# Patient Record
Sex: FEMALE | Race: ASIAN | Employment: UNEMPLOYED | URBAN - METROPOLITAN AREA
[De-identification: names, ages, dates, MRNs, and addresses within clinical notes are randomized per-mention and may not be internally consistent; named-entity substitution may affect disease eponyms.]

---

## 2023-06-19 LAB
ANTIBODY SCREEN OB: NEGATIVE
HEPATITIS B SURFACE ANTIGEN OB: NEGATIVE
HIV ANTIGEN-ANTIBODY QUAL: NONREACTIVE
RH FACTOR OB: POSITIVE
SYPHILIS-TOTAL QUAL: NONREACTIVE

## 2023-10-17 LAB — HIV ANTIGEN-ANTIBODY QUAL: NONREACTIVE

## 2023-12-12 LAB — STREPTOCOCCUS GROUP B PCR: NEGATIVE

## 2023-12-20 ENCOUNTER — TELEPHONE (OUTPATIENT)
Dept: OBGYN UNIT | Facility: HOSPITAL | Age: 38
End: 2023-12-20

## 2023-12-20 RX ORDER — CHOLECALCIFEROL (VITAMIN D3) 25 MCG
1 TABLET,CHEWABLE ORAL DAILY
COMMUNITY

## 2023-12-20 RX ORDER — MELATONIN
325
COMMUNITY

## 2023-12-21 ENCOUNTER — HOSPITAL ENCOUNTER (OUTPATIENT)
Facility: HOSPITAL | Age: 38
Discharge: HOME | End: 2023-12-21
Attending: STUDENT IN AN ORGANIZED HEALTH CARE EDUCATION/TRAINING PROGRAM | Admitting: STUDENT IN AN ORGANIZED HEALTH CARE EDUCATION/TRAINING PROGRAM
Payer: COMMERCIAL

## 2023-12-21 ENCOUNTER — APPOINTMENT (OUTPATIENT)
Dept: OBGYN CLINIC | Facility: HOSPITAL | Age: 38
End: 2023-12-21
Payer: COMMERCIAL

## 2023-12-21 VITALS
DIASTOLIC BLOOD PRESSURE: 91 MMHG | WEIGHT: 164.81 LBS | HEART RATE: 127 BPM | BODY MASS INDEX: 30.33 KG/M2 | HEIGHT: 62 IN | TEMPERATURE: 98 F | SYSTOLIC BLOOD PRESSURE: 139 MMHG

## 2023-12-21 LAB
ANTIBODY SCREEN: NEGATIVE
BASOPHILS # BLD AUTO: 0.03 X10(3) UL (ref 0–0.2)
BASOPHILS NFR BLD AUTO: 0.3 %
EOSINOPHIL # BLD AUTO: 0.1 X10(3) UL (ref 0–0.7)
EOSINOPHIL NFR BLD AUTO: 1 %
ERYTHROCYTE [DISTWIDTH] IN BLOOD BY AUTOMATED COUNT: 13.5 %
HCT VFR BLD AUTO: 36.3 %
HGB BLD-MCNC: 12.6 G/DL
IMM GRANULOCYTES # BLD AUTO: 0.08 X10(3) UL (ref 0–1)
IMM GRANULOCYTES NFR BLD: 0.8 %
LYMPHOCYTES # BLD AUTO: 2.28 X10(3) UL (ref 1–4)
LYMPHOCYTES NFR BLD AUTO: 22.6 %
MCH RBC QN AUTO: 28.7 PG (ref 26–34)
MCHC RBC AUTO-ENTMCNC: 34.7 G/DL (ref 31–37)
MCV RBC AUTO: 82.7 FL
MONOCYTES # BLD AUTO: 0.72 X10(3) UL (ref 0.1–1)
MONOCYTES NFR BLD AUTO: 7.1 %
NEUTROPHILS # BLD AUTO: 6.86 X10 (3) UL (ref 1.5–7.7)
NEUTROPHILS # BLD AUTO: 6.86 X10(3) UL (ref 1.5–7.7)
NEUTROPHILS NFR BLD AUTO: 68.2 %
PLATELET # BLD AUTO: 217 10(3)UL (ref 150–450)
RBC # BLD AUTO: 4.39 X10(6)UL
RH BLOOD TYPE: POSITIVE
WBC # BLD AUTO: 10.1 X10(3) UL (ref 4–11)

## 2023-12-21 PROCEDURE — 86901 BLOOD TYPING SEROLOGIC RH(D): CPT | Performed by: STUDENT IN AN ORGANIZED HEALTH CARE EDUCATION/TRAINING PROGRAM

## 2023-12-21 PROCEDURE — 59025 FETAL NON-STRESS TEST: CPT

## 2023-12-21 PROCEDURE — 86900 BLOOD TYPING SEROLOGIC ABO: CPT | Performed by: STUDENT IN AN ORGANIZED HEALTH CARE EDUCATION/TRAINING PROGRAM

## 2023-12-21 PROCEDURE — 86850 RBC ANTIBODY SCREEN: CPT | Performed by: STUDENT IN AN ORGANIZED HEALTH CARE EDUCATION/TRAINING PROGRAM

## 2023-12-21 PROCEDURE — 85025 COMPLETE CBC W/AUTO DIFF WBC: CPT | Performed by: STUDENT IN AN ORGANIZED HEALTH CARE EDUCATION/TRAINING PROGRAM

## 2023-12-21 PROCEDURE — 59412 ANTEPARTUM MANIPULATION: CPT

## 2023-12-21 PROCEDURE — 36415 COLL VENOUS BLD VENIPUNCTURE: CPT

## 2023-12-21 PROCEDURE — 96372 THER/PROPH/DIAG INJ SC/IM: CPT

## 2023-12-21 RX ORDER — TERBUTALINE SULFATE 1 MG/ML
0.25 INJECTION, SOLUTION SUBCUTANEOUS ONCE
Status: COMPLETED | OUTPATIENT
Start: 2023-12-21 | End: 2023-12-21

## 2023-12-21 RX ORDER — SODIUM CHLORIDE, SODIUM LACTATE, POTASSIUM CHLORIDE, CALCIUM CHLORIDE 600; 310; 30; 20 MG/100ML; MG/100ML; MG/100ML; MG/100ML
INJECTION, SOLUTION INTRAVENOUS CONTINUOUS
Status: DISCONTINUED | OUTPATIENT
Start: 2023-12-21 | End: 2023-12-21

## 2023-12-21 NOTE — PROGRESS NOTES
Patient discharged home with her  Stevie Jacob. Reviewed all paperwork. Patient verbalized she understood. Avsd of follow up appointment. Patent left in stable condition.

## 2023-12-21 NOTE — PROCEDURES
Date 2023  Procedure: External cephalic version  Diagnosis: complete breech presentation    Procedure:  A nonstress test was performed and found to be reactive. An ultrasound revealed that the baby was asher breech with the head in the maternal right upper quadrant. Informed consent was obtained. 0.25mg terbutaline was given subcutaneously. Gel was applied to her abdomen, and gentle pressure was applied to lift the breech out of the pelvis. Gentle pressure was used to attempt forward roll. Fetal heart tones were intermittently obtained and were in the 150s. Head moved to maternal left upper quadrant and no futher advancement made. Attempt was made in reverse direction and head moved to right upper quadrant and then no further movement made. She tolerated the procedure well. A nonstress test was again performed and was found to be reactive and reassuring for two hour. There were no complications. Will schedule patient for primary  section at 39wga.     Machelle Marino MD

## 2024-01-01 ENCOUNTER — ANESTHESIA EVENT (OUTPATIENT)
Dept: OBGYN UNIT | Facility: HOSPITAL | Age: 39
End: 2024-01-01
Payer: COMMERCIAL

## 2024-01-02 ENCOUNTER — HOSPITAL ENCOUNTER (INPATIENT)
Facility: HOSPITAL | Age: 39
LOS: 3 days | Discharge: HOME | End: 2024-01-05
Attending: OBSTETRICS & GYNECOLOGY | Admitting: OBSTETRICS & GYNECOLOGY
Payer: COMMERCIAL

## 2024-01-02 ENCOUNTER — ANESTHESIA (OUTPATIENT)
Dept: OBGYN UNIT | Facility: HOSPITAL | Age: 39
End: 2024-01-02
Payer: COMMERCIAL

## 2024-01-02 DIAGNOSIS — Z98.891 H/O CESAREAN SECTION: Primary | ICD-10-CM

## 2024-01-02 PROBLEM — Z34.90 PREGNANCY (HCC): Status: ACTIVE | Noted: 2024-01-02

## 2024-01-02 PROBLEM — Z34.90 PREGNANCY: Status: ACTIVE | Noted: 2024-01-02

## 2024-01-02 PROBLEM — I48.91 ATRIAL FIBRILLATION (HCC): Status: ACTIVE | Noted: 2024-01-02

## 2024-01-02 LAB
ANION GAP SERPL CALC-SCNC: 7 MMOL/L (ref 0–18)
ANTIBODY SCREEN: NEGATIVE
BASOPHILS # BLD AUTO: 0.02 X10(3) UL (ref 0–0.2)
BASOPHILS # BLD AUTO: 0.02 X10(3) UL (ref 0–0.2)
BASOPHILS NFR BLD AUTO: 0.1 %
BASOPHILS NFR BLD AUTO: 0.2 %
BUN BLD-MCNC: 8 MG/DL (ref 9–23)
CALCIUM BLD-MCNC: 8.2 MG/DL (ref 8.5–10.1)
CHLORIDE SERPL-SCNC: 110 MMOL/L (ref 98–112)
CO2 SERPL-SCNC: 21 MMOL/L (ref 21–32)
CREAT BLD-MCNC: 0.35 MG/DL
EGFRCR SERPLBLD CKD-EPI 2021: 136 ML/MIN/1.73M2 (ref 60–?)
EOSINOPHIL # BLD AUTO: 0.03 X10(3) UL (ref 0–0.7)
EOSINOPHIL # BLD AUTO: 0.09 X10(3) UL (ref 0–0.7)
EOSINOPHIL NFR BLD AUTO: 0.2 %
EOSINOPHIL NFR BLD AUTO: 0.9 %
ERYTHROCYTE [DISTWIDTH] IN BLOOD BY AUTOMATED COUNT: 13.6 %
ERYTHROCYTE [DISTWIDTH] IN BLOOD BY AUTOMATED COUNT: 13.6 %
GLUCOSE BLD-MCNC: 67 MG/DL (ref 70–99)
HCT VFR BLD AUTO: 34.1 %
HCT VFR BLD AUTO: 38 %
HGB BLD-MCNC: 11.5 G/DL
HGB BLD-MCNC: 12.9 G/DL
IMM GRANULOCYTES # BLD AUTO: 0.07 X10(3) UL (ref 0–1)
IMM GRANULOCYTES # BLD AUTO: 0.1 X10(3) UL (ref 0–1)
IMM GRANULOCYTES NFR BLD: 0.5 %
IMM GRANULOCYTES NFR BLD: 1 %
LYMPHOCYTES # BLD AUTO: 1.85 X10(3) UL (ref 1–4)
LYMPHOCYTES # BLD AUTO: 2.01 X10(3) UL (ref 1–4)
LYMPHOCYTES NFR BLD AUTO: 11.9 %
LYMPHOCYTES NFR BLD AUTO: 20.7 %
MAGNESIUM SERPL-MCNC: 1.6 MG/DL (ref 1.6–2.6)
MCH RBC QN AUTO: 28.5 PG (ref 26–34)
MCH RBC QN AUTO: 29 PG (ref 26–34)
MCHC RBC AUTO-ENTMCNC: 33.7 G/DL (ref 31–37)
MCHC RBC AUTO-ENTMCNC: 33.9 G/DL (ref 31–37)
MCV RBC AUTO: 84.1 FL
MCV RBC AUTO: 85.9 FL
MONOCYTES # BLD AUTO: 0.73 X10(3) UL (ref 0.1–1)
MONOCYTES # BLD AUTO: 1.14 X10(3) UL (ref 0.1–1)
MONOCYTES NFR BLD AUTO: 7.4 %
MONOCYTES NFR BLD AUTO: 7.5 %
NEUTROPHILS # BLD AUTO: 12.38 X10 (3) UL (ref 1.5–7.7)
NEUTROPHILS # BLD AUTO: 12.38 X10(3) UL (ref 1.5–7.7)
NEUTROPHILS # BLD AUTO: 6.76 X10 (3) UL (ref 1.5–7.7)
NEUTROPHILS # BLD AUTO: 6.76 X10(3) UL (ref 1.5–7.7)
NEUTROPHILS NFR BLD AUTO: 69.7 %
NEUTROPHILS NFR BLD AUTO: 79.9 %
OSMOLALITY SERPL CALC.SUM OF ELEC: 283 MOSM/KG (ref 275–295)
PLATELET # BLD AUTO: 199 10(3)UL (ref 150–450)
PLATELET # BLD AUTO: 235 10(3)UL (ref 150–450)
POTASSIUM SERPL-SCNC: 3.8 MMOL/L (ref 3.5–5.1)
POTASSIUM SERPL-SCNC: 3.8 MMOL/L (ref 3.5–5.1)
RBC # BLD AUTO: 3.97 X10(6)UL
RBC # BLD AUTO: 4.52 X10(6)UL
RH BLOOD TYPE: POSITIVE
SODIUM SERPL-SCNC: 138 MMOL/L (ref 136–145)
TSI SER-ACNC: 2.54 MIU/ML (ref 0.36–3.74)
WBC # BLD AUTO: 15.5 X10(3) UL (ref 4–11)
WBC # BLD AUTO: 9.7 X10(3) UL (ref 4–11)

## 2024-01-02 PROCEDURE — 99254 IP/OBS CNSLTJ NEW/EST MOD 60: CPT | Performed by: HOSPITALIST

## 2024-01-02 RX ORDER — BUPIVACAINE HYDROCHLORIDE 7.5 MG/ML
INJECTION, SOLUTION INTRASPINAL AS NEEDED
Status: DISCONTINUED | OUTPATIENT
Start: 2024-01-02 | End: 2024-01-02 | Stop reason: SURG

## 2024-01-02 RX ORDER — ONDANSETRON 2 MG/ML
4 INJECTION INTRAMUSCULAR; INTRAVENOUS EVERY 6 HOURS PRN
Status: DISCONTINUED | OUTPATIENT
Start: 2024-01-02 | End: 2024-01-05

## 2024-01-02 RX ORDER — SIMETHICONE 80 MG
80 TABLET,CHEWABLE ORAL 3 TIMES DAILY PRN
Status: DISCONTINUED | OUTPATIENT
Start: 2024-01-02 | End: 2024-01-05

## 2024-01-02 RX ORDER — POTASSIUM CHLORIDE 20 MEQ/1
40 TABLET, EXTENDED RELEASE ORAL ONCE
Status: COMPLETED | OUTPATIENT
Start: 2024-01-02 | End: 2024-01-02

## 2024-01-02 RX ORDER — SODIUM CHLORIDE, SODIUM LACTATE, POTASSIUM CHLORIDE, CALCIUM CHLORIDE 600; 310; 30; 20 MG/100ML; MG/100ML; MG/100ML; MG/100ML
125 INJECTION, SOLUTION INTRAVENOUS CONTINUOUS
Status: DISCONTINUED | OUTPATIENT
Start: 2024-01-02 | End: 2024-01-02

## 2024-01-02 RX ORDER — ONDANSETRON 2 MG/ML
4 INJECTION INTRAMUSCULAR; INTRAVENOUS ONCE AS NEEDED
Status: DISCONTINUED | OUTPATIENT
Start: 2024-01-02 | End: 2024-01-02 | Stop reason: HOSPADM

## 2024-01-02 RX ORDER — CEFAZOLIN SODIUM/WATER 2 G/20 ML
2 SYRINGE (ML) INTRAVENOUS ONCE
Status: COMPLETED | OUTPATIENT
Start: 2024-01-02 | End: 2024-01-02

## 2024-01-02 RX ORDER — ACETAMINOPHEN 500 MG
1000 TABLET ORAL EVERY 6 HOURS
Status: DISCONTINUED | OUTPATIENT
Start: 2024-01-02 | End: 2024-01-05

## 2024-01-02 RX ORDER — KETOROLAC TROMETHAMINE 30 MG/ML
30 INJECTION, SOLUTION INTRAMUSCULAR; INTRAVENOUS EVERY 6 HOURS
Status: DISPENSED | OUTPATIENT
Start: 2024-01-02 | End: 2024-01-03

## 2024-01-02 RX ORDER — KETOROLAC TROMETHAMINE 30 MG/ML
30 INJECTION, SOLUTION INTRAMUSCULAR; INTRAVENOUS ONCE
Status: COMPLETED | OUTPATIENT
Start: 2024-01-02 | End: 2024-01-02

## 2024-01-02 RX ORDER — PHENYLEPHRINE HCL 10 MG/ML
VIAL (ML) INJECTION AS NEEDED
Status: DISCONTINUED | OUTPATIENT
Start: 2024-01-02 | End: 2024-01-02 | Stop reason: SURG

## 2024-01-02 RX ORDER — NALBUPHINE HYDROCHLORIDE 10 MG/ML
2.5 INJECTION, SOLUTION INTRAMUSCULAR; INTRAVENOUS; SUBCUTANEOUS
Status: DISCONTINUED | OUTPATIENT
Start: 2024-01-02 | End: 2024-01-02 | Stop reason: HOSPADM

## 2024-01-02 RX ORDER — DIPHENHYDRAMINE HCL 25 MG
25 CAPSULE ORAL EVERY 4 HOURS PRN
Status: DISCONTINUED | OUTPATIENT
Start: 2024-01-02 | End: 2024-01-05

## 2024-01-02 RX ORDER — HYDROCODONE BITARTRATE AND ACETAMINOPHEN 5; 325 MG/1; MG/1
2 TABLET ORAL EVERY 6 HOURS PRN
Status: DISCONTINUED | OUTPATIENT
Start: 2024-01-02 | End: 2024-01-05

## 2024-01-02 RX ORDER — HYDROMORPHONE HYDROCHLORIDE 1 MG/ML
0.2 INJECTION, SOLUTION INTRAMUSCULAR; INTRAVENOUS; SUBCUTANEOUS EVERY 5 MIN PRN
Status: DISCONTINUED | OUTPATIENT
Start: 2024-01-02 | End: 2024-01-02 | Stop reason: HOSPADM

## 2024-01-02 RX ORDER — ACETAMINOPHEN 500 MG
500 TABLET ORAL EVERY 4 HOURS PRN
Status: DISCONTINUED | OUTPATIENT
Start: 2024-01-02 | End: 2024-01-05

## 2024-01-02 RX ORDER — HYDROCODONE BITARTRATE AND ACETAMINOPHEN 5; 325 MG/1; MG/1
1 TABLET ORAL EVERY 6 HOURS PRN
Status: DISCONTINUED | OUTPATIENT
Start: 2024-01-02 | End: 2024-01-05

## 2024-01-02 RX ORDER — ONDANSETRON 2 MG/ML
4 INJECTION INTRAMUSCULAR; INTRAVENOUS EVERY 6 HOURS PRN
Status: DISCONTINUED | OUTPATIENT
Start: 2024-01-02 | End: 2024-01-02

## 2024-01-02 RX ORDER — NALOXONE HYDROCHLORIDE 0.4 MG/ML
0.08 INJECTION, SOLUTION INTRAMUSCULAR; INTRAVENOUS; SUBCUTANEOUS
Status: ACTIVE | OUTPATIENT
Start: 2024-01-02 | End: 2024-01-03

## 2024-01-02 RX ORDER — DOCUSATE SODIUM 100 MG/1
100 CAPSULE, LIQUID FILLED ORAL
Status: DISCONTINUED | OUTPATIENT
Start: 2024-01-02 | End: 2024-01-05

## 2024-01-02 RX ORDER — SODIUM CHLORIDE, SODIUM LACTATE, POTASSIUM CHLORIDE, CALCIUM CHLORIDE 600; 310; 30; 20 MG/100ML; MG/100ML; MG/100ML; MG/100ML
INJECTION, SOLUTION INTRAVENOUS CONTINUOUS
Status: DISCONTINUED | OUTPATIENT
Start: 2024-01-02 | End: 2024-01-05

## 2024-01-02 RX ORDER — METOPROLOL TARTRATE 1 MG/ML
5 INJECTION, SOLUTION INTRAVENOUS EVERY 6 HOURS PRN
Status: DISCONTINUED | OUTPATIENT
Start: 2024-01-02 | End: 2024-01-05

## 2024-01-02 RX ORDER — DEXTROSE, SODIUM CHLORIDE, SODIUM LACTATE, POTASSIUM CHLORIDE, AND CALCIUM CHLORIDE 5; .6; .31; .03; .02 G/100ML; G/100ML; G/100ML; G/100ML; G/100ML
INJECTION, SOLUTION INTRAVENOUS CONTINUOUS PRN
Status: DISCONTINUED | OUTPATIENT
Start: 2024-01-02 | End: 2024-01-05

## 2024-01-02 RX ORDER — MAGNESIUM OXIDE 400 MG/1
400 TABLET ORAL ONCE
Status: COMPLETED | OUTPATIENT
Start: 2024-01-02 | End: 2024-01-02

## 2024-01-02 RX ORDER — ONDANSETRON 2 MG/ML
INJECTION INTRAMUSCULAR; INTRAVENOUS
Status: COMPLETED
Start: 2024-01-02 | End: 2024-01-02

## 2024-01-02 RX ORDER — SODIUM CHLORIDE 9 MG/ML
INJECTION, SOLUTION INTRAVENOUS CONTINUOUS
Status: DISCONTINUED | OUTPATIENT
Start: 2024-01-02 | End: 2024-01-05

## 2024-01-02 RX ORDER — ACETAMINOPHEN 500 MG
1000 TABLET ORAL ONCE
Status: COMPLETED | OUTPATIENT
Start: 2024-01-02 | End: 2024-01-02

## 2024-01-02 RX ORDER — MORPHINE SULFATE 2 MG/ML
INJECTION, SOLUTION INTRAMUSCULAR; INTRAVENOUS AS NEEDED
Status: DISCONTINUED | OUTPATIENT
Start: 2024-01-02 | End: 2024-01-02 | Stop reason: SURG

## 2024-01-02 RX ORDER — CITRIC ACID/SODIUM CITRATE 334-500MG
30 SOLUTION, ORAL ORAL ONCE
Status: DISCONTINUED | OUTPATIENT
Start: 2024-01-02 | End: 2024-01-02

## 2024-01-02 RX ORDER — METOCLOPRAMIDE HYDROCHLORIDE 5 MG/ML
10 INJECTION INTRAMUSCULAR; INTRAVENOUS EVERY 6 HOURS PRN
Status: DISCONTINUED | OUTPATIENT
Start: 2024-01-02 | End: 2024-01-05

## 2024-01-02 RX ORDER — EPHEDRINE SULFATE 50 MG/ML
INJECTION INTRAVENOUS AS NEEDED
Status: DISCONTINUED | OUTPATIENT
Start: 2024-01-02 | End: 2024-01-02 | Stop reason: SURG

## 2024-01-02 RX ORDER — NALBUPHINE HYDROCHLORIDE 10 MG/ML
2.5 INJECTION, SOLUTION INTRAMUSCULAR; INTRAVENOUS; SUBCUTANEOUS EVERY 4 HOURS PRN
Status: DISCONTINUED | OUTPATIENT
Start: 2024-01-02 | End: 2024-01-05

## 2024-01-02 RX ORDER — KETOROLAC TROMETHAMINE 30 MG/ML
INJECTION, SOLUTION INTRAMUSCULAR; INTRAVENOUS
Status: DISPENSED
Start: 2024-01-02 | End: 2024-01-03

## 2024-01-02 RX ORDER — DIPHENHYDRAMINE HYDROCHLORIDE 50 MG/ML
12.5 INJECTION INTRAMUSCULAR; INTRAVENOUS EVERY 4 HOURS PRN
Status: DISCONTINUED | OUTPATIENT
Start: 2024-01-02 | End: 2024-01-05

## 2024-01-02 RX ORDER — BISACODYL 10 MG
10 SUPPOSITORY, RECTAL RECTAL ONCE AS NEEDED
Status: DISCONTINUED | OUTPATIENT
Start: 2024-01-02 | End: 2024-01-05

## 2024-01-02 RX ORDER — DILTIAZEM HYDROCHLORIDE 5 MG/ML
10 INJECTION INTRAVENOUS ONCE
Status: COMPLETED | OUTPATIENT
Start: 2024-01-02 | End: 2024-01-02

## 2024-01-02 RX ADMIN — PHENYLEPHRINE HCL 100 MCG: 10 MG/ML VIAL (ML) INJECTION at 13:57:00

## 2024-01-02 RX ADMIN — SODIUM CHLORIDE, SODIUM LACTATE, POTASSIUM CHLORIDE, CALCIUM CHLORIDE: 600; 310; 30; 20 INJECTION, SOLUTION INTRAVENOUS at 13:28:00

## 2024-01-02 RX ADMIN — CEFAZOLIN SODIUM/WATER 2 G: 2 G/20 ML SYRINGE (ML) INTRAVENOUS at 13:28:00

## 2024-01-02 RX ADMIN — BUPIVACAINE HYDROCHLORIDE 1.5 ML: 7.5 INJECTION, SOLUTION INTRASPINAL at 13:33:00

## 2024-01-02 RX ADMIN — SODIUM CHLORIDE, SODIUM LACTATE, POTASSIUM CHLORIDE, CALCIUM CHLORIDE: 600; 310; 30; 20 INJECTION, SOLUTION INTRAVENOUS at 13:57:00

## 2024-01-02 RX ADMIN — PHENYLEPHRINE HCL 100 MCG: 10 MG/ML VIAL (ML) INJECTION at 13:39:00

## 2024-01-02 RX ADMIN — SODIUM CHLORIDE, SODIUM LACTATE, POTASSIUM CHLORIDE, CALCIUM CHLORIDE: 600; 310; 30; 20 INJECTION, SOLUTION INTRAVENOUS at 14:19:00

## 2024-01-02 RX ADMIN — PHENYLEPHRINE HCL 100 MCG: 10 MG/ML VIAL (ML) INJECTION at 14:03:00

## 2024-01-02 RX ADMIN — MORPHINE SULFATE 0.2 MG: 2 INJECTION, SOLUTION INTRAMUSCULAR; INTRAVENOUS at 13:33:00

## 2024-01-02 RX ADMIN — EPHEDRINE SULFATE 10 MG: 50 INJECTION INTRAVENOUS at 13:36:00

## 2024-01-02 NOTE — PROGRESS NOTES
Admitted to mother/baby per cart.  Oriented to room.  Safety precautions initiated.  Bed in low position.  Call light within reach.  IV infusing on pump, Smith to gravity, SCD's being applied.

## 2024-01-02 NOTE — PROGRESS NOTES
Pt is a 38 year old female admitted to TRG4/TRG4-A.     Chief Complaint   Patient presents with    Scheduled      Breech      Pt is  39w0d intra-uterine pregnancy.  History obtained, consents signed. Oriented to room, staff, and plan of care.     EFM tested and applied. Abdomen soft and non-tender. Active fetal movement per patient report.

## 2024-01-02 NOTE — H&P
Pt is 37 y/o G1 at 39w here for primary CS for known Breech - US confirms breech this AM- Leopold's by me confirms Breech as well. Hd attempt at ECV - unsuccessful   PMH AMA - nl level 2 and nl at 32 weeks - EFW 48% - pt's mother with achondroplasia            GBS neg            Low risk NIPT            Nl 1 hour     Current Outpatient Medications  Medication Sig Dispense Refill  Docosahexaenoic Acid (PRENATAL DHA) 200 MG Oral Cap Take by mouth.    History reviewed. No pertinent past medical history.  History reviewed. No pertinent surgical history.  Family History  Problem Relation Age of Onset  Other (Achondroplasia) Mother    Social History:  Social History  Tobacco Use  Smoking status: Never  Smokeless tobacco: Never  Vaping Use  Vaping Use: Never used  Alcohol use: Never  Drug use: Never  ./52   Pulse 105   Temp 98.7 °F (37.1 °C) (Oral)   Resp 16   Wt 165 lb 12.8 oz (75.2 kg)   LMP 04/04/2023   BMI 30.33 kg/m²   Lungs - clear  FHT's - cat 1 - + accels  Ctx's - irregular - does not feel them  A/P Term IUP - Breech presentation - for primary CS - D/W pt and her  risk of bleeding/infection/anesthesia complication - also risk of adjacent organs such as bowel, bladder, ureters.  They understand and accept these risks and desire to proceeed

## 2024-01-02 NOTE — PROGRESS NOTES
Pt transferred to [facility] in room 2206. Vital signs stable on transfer. All belongings sent with patient. Baby ID bands matched and verified with parents. Report given to Marija Lawrence.

## 2024-01-02 NOTE — ANESTHESIA PROCEDURE NOTES
Spinal Block    Date/Time: 1/2/2024 1:31 PM    Performed by: Rah Smith MD  Authorized by: Rah Smith MD      General Information and Staff    Start Time:  1/2/2024 1:31 PM  End Time:  1/2/2024 1:33 PM  Anesthesiologist:  Rah Smith MD  Performed by:  Anesthesiologist  Patient Location:  OB  Site identification: surface landmarks    Reason for Block: at surgeon's request, post-op pain management and surgical anesthesia    Preanesthetic Checklist: patient identified, IV checked, risks and benefits discussed, monitors and equipment checked, pre-op evaluation, timeout performed, anesthesia consent and sterile technique used      Procedure Details    Patient Position:  Sitting  Prep: ChloraPrep    Monitoring:  Cardiac monitor, heart rate and continuous pulse ox  Approach:  Midline  Location:  L3-4  Injection Technique:  Single-shot    Needle    Needle Type:  Sprotte  Needle Gauge:  24 G  Needle Length:  3.5 in    Assessment    Sensory Level:   Events: clear CSF, CSF aspirated, well tolerated and blood negative      Additional Comments

## 2024-01-02 NOTE — OPERATIVE REPORT
[facility]    PATIENT'S NAME: STEPHAN AKHTAR   ATTENDING PHYSICIAN: Kari Wiley M.D.   OPERATING PHYSICIAN: Kari Wiley M.D.   PATIENT ACCOUNT#:   [id number]    LOCATION:  [address] [place] [address]  MEDICAL RECORD #:   [id number]       YOB: 1985  ADMISSION DATE:       2024      OPERATION DATE:  2024    OPERATIVE REPORT    PREOPERATIVE DIAGNOSIS:  A 39-week intrauterine pregnancy, breech presentation.  POSTOPERATIVE DIAGNOSIS:  A 39-week intrauterine pregnancy, breech presentation.  PROCEDURE:  Primary low transverse  section.    ASSISTANT SURGEON:  Deloris Pena MD.  Surgical assist as a physician was essential for performance of part of the surgery as well as retraction and assistance in delivery of the baby.    ANESTHESIA:  Spinal.    COMPLICATIONS:  None.    SPECIMEN:  None.    ESTIMATED BLOOD LOSS:  800 mL.      COUNTS:  All sponge, needle, and instrument counts were correct.      FINDINGS:  Normal uterus, ovaries, tubes.  Liveborn female infant, asher breech; Apgars 9 at one minute, 9 at five minutes; 6 pounds 4 ounces.    OPERATIVE TECHNIQUE:  Patient was taken to the operating room.  After spinal was placed, the patient was placed in the supine position.  The abdomen was prepped and draped in usual sterile manner for an abdominal procedure.  A Pfannenstiel skin incision was made and carried down sharply to the level of the fascia.  Fascia was incised in the midline and extended bluntly bilaterally.  The fascia was undermined superiorly and inferiorly, muscles  in the midline.  Peritoneum was entered high.  This was extended superiorly and inferiorly, keeping the bowel and bladder out of harm's way.  A bladder flap was created on the surface of the uterus.  A small uterine incision was made and extended bluntly bilaterally.  The baby was indeed asher breech.  The breech was brought through the uterine incision without complication and delivered up to the  shoulders easily.  The arms were then delivered and the head delivered easily.  A vigorous liveborn female.  After delayed cord clamping, baby was handed off to the waiting neonatologist.  The placenta was manually removed.  The lining of the uterus was explored, and it was clean.  The uterus was removed from the abdomen.  Reinspection of the lining showed it was clean.  The uterus was closed in 2 layers, first layer being 0 Vicryl in a running locked fashion, second layer being 0 Vicryl in a running imbricating-type fashion.  Then, 4 or 5 additional figure-of-eight sutures were placed of 2-0 Vicryl for hemostasis along the incision.  Uterus was placed back in the abdomen.  Irrigation was performed.  Gutters were cleared.  Reinspection showed excellent hemostasis.  At this point, the peritoneum was closed with 2-0 Vicryl running-type fashion.  Subfascial hemostasis was achieved, and the fascia was closed with 0 Vicryl in a running-type fashion.  Interrupted sutures of plain gut were placed in subcutaneous tissue, and the skin was closed with subcuticular closure of 4-0 undyed Vicryl.    The patient was transferred to the recovery room in stable condition.      Dictated By Kari Wiley M.D.  d: 01/02/2024 14:21:42  t: 01/02/2024 15:53:02  [facility] [id number]  KAYLA/

## 2024-01-02 NOTE — ANESTHESIA PREPROCEDURE EVALUATION
PRE-OP EVALUATION    Patient Name: Lucy Hoffman    Admit Diagnosis: Pregnancy    Pre-op Diagnosis: * No pre-op diagnosis entered *     SECTION    Anesthesia Procedure:  SECTION (Abdomen)    Surgeon(s) and Role:     * Kari Wiley MD - Primary     * Deloris Pena MD - Assisting Surgeon    Pre-op vitals reviewed.  Temp: 98.7 °F (37.1 °C)  Pulse: 105  Resp: 16  BP: 130/52     Body mass index is 30.33 kg/m².    Current medications reviewed.  Hospital Medications:  • lactated ringers IV bolus 1,000 mL  1,000 mL Intravenous Once    Followed by   • lactated ringers infusion  125 mL/hr Intravenous Continuous   • [COMPLETED] acetaminophen (Tylenol Extra Strength) tab 1,000 mg  1,000 mg Oral Once   • ondansetron (Zofran) 4 MG/2ML injection 4 mg  4 mg Intravenous Q6H PRN   • sodium citrate-citric acid (Bicitra) 500-334 MG/5ML oral solution 30 mL  30 mL Oral Once   • oxyTOCIN in sodium chloride 0.9% (Pitocin) 30 Units/500mL infusion premix  62.5-900 ton-units/min Intravenous Continuous   • ceFAZolin (Ancef) 2 g in 20mL IV syringe premix  2 g Intravenous Once   • [COMPLETED] terbutaline (Brethine) 1 MG/ML injection 0.25 mg  0.25 mg Subcutaneous Once       Outpatient Medications:     Medications Prior to Admission   Medication Sig Dispense Refill Last Dose   • prenatal vitamin with DHA 27-0.8-228 MG Oral Cap Take 1 capsule by mouth daily.   2024   • ferrous sulfate 325 (65 FE) MG Oral Tab EC Take 1 tablet (325 mg total) by mouth daily with breakfast.   2024       Allergies: Patient has no known allergies.      Anesthesia Evaluation    Patient summary reviewed.    Anesthetic Complications  (-) history of anesthetic complications         GI/Hepatic/Renal    Negative GI/hepatic/renal ROS.                             Cardiovascular    Negative cardiovascular ROS.    Exercise tolerance: good                                                Endo/Other    Negative endo/other ROS.                               Pulmonary    Negative pulmonary ROS.                       Neuro/Psych    Negative neuro/psych ROS.                          IUP with breech presentation, S/F primary C/S.    History reviewed. No pertinent surgical history.  Social History     Socioeconomic History   • Marital status:    Tobacco Use   • Smoking status: Never   • Smokeless tobacco: Never   Vaping Use   • Vaping Use: Never used   Substance and Sexual Activity   • Alcohol use: Not Currently   • Drug use: Never     History   Drug Use Unknown     Available pre-op labs reviewed.  Lab Results   Component Value Date    WBC 9.7 01/02/2024    RBC 4.52 01/02/2024    HGB 12.9 01/02/2024    HCT 38.0 01/02/2024    MCV 84.1 01/02/2024    MCH 28.5 01/02/2024    MCHC 33.9 01/02/2024    RDW 13.6 01/02/2024    .0 01/02/2024               Airway      Mallampati: II  Mouth opening: 3 FB  TM distance: 4 - 6 cm  Neck ROM: full Cardiovascular    Cardiovascular exam normal.  Rhythm: regular  Rate: normal  (-) murmur   Dental    Dentition appears grossly intact         Pulmonary    Pulmonary exam normal.  Breath sounds clear to auscultation bilaterally.               Other findings        ASA: 2   Plan: spinal  NPO status verified and patient meets guidelines.    Post-procedure pain management plan discussed with surgeon and patient.  Surgeon requests: regional block    Plan/risks discussed with: patient and spouse            Present on Admission:  **None**

## 2024-01-02 NOTE — BRIEF OP NOTE
Pre-Operative Diagnosis: 39 wks IUP, Breech     Post-Operative Diagnosis: *same     Procedure Performed: Primary LTCS   SECTION    Surgeon(s) and Role:     * Kari Wiley MD - Primary     * Deloris Pena MD - Assisting Surgeon    Assistant(s):        Surgical Findings: Nl ut, tubes and ovaries - Stanley Breech - liveborn female - Apg 9 and 9, 6#4     Specimen: none     Estimated Blood Loss: 800cc    Dictation Number:  [id number] - OND    Kari Wiley MD  2024  2:18 PM

## 2024-01-02 NOTE — ANESTHESIA POSTPROCEDURE EVALUATION
[facility]    Lucy Hoffman Patient Status:  Inpatient   Age/Gender 38 year old female MRN [id number]   Location [facility] LABOR & DELIVERY Attending Kari Wiley MD   Hosp Day # 0 PCP No primary care provider on file.       Anesthesia Post-op Note     SECTION    Procedure Summary       Date: 24 Room / Location:  L+D OR 03 /  L+D OR    Anesthesia Start: 1328 Anesthesia Stop: 1430    Procedure:  SECTION (Abdomen) Diagnosis: (same-delivered)    Surgeons: Kari Wiley MD Anesthesiologist: Rah Smith MD    Anesthesia Type: spinal ASA Status: 2            Anesthesia Type: spinal    Vitals Value Taken Time   BP 97/55 24 1425   Temp 97.5 24 1430   Pulse 99 24 1430   Resp 18 24 1430   SpO2 98 % (RA) 24 1430   Vitals shown include unfiled device data.    Patient Location: Labor and Delivery    Anesthesia Type: spinal    Airway Patency: patent    Postop Pain Control: adequate    Mental Status: preanesthetic baseline    Nausea/Vomiting: none    Cardiopulmonary/Hydration status: stable euvolemic    Complications: no apparent anesthesia related complications    Postop vital signs: stable    Comments: Spinal with spinal Duramorph for postoperative pain control.    Dental Exam: Unchanged from Preop    Patient to be discharged from PACU when criteria met.

## 2024-01-02 NOTE — PLAN OF CARE
Problem: BIRTH - VAGINAL/ SECTION  Goal: Fetal and maternal status remain reassuring during the birth process  Description: INTERVENTIONS:  - Monitor vital signs  - Monitor fetal heart rate  - Monitor uterine activity  - Monitor labor progression (vaginal delivery)  - DVT prophylaxis (C/S delivery)  - Surgical antibiotic prophylaxis (C/S delivery)  Outcome: Progressing     Problem: PAIN - ADULT  Goal: Verbalizes/displays adequate comfort level or patient's stated pain goal  Description: INTERVENTIONS:  - Encourage pt to monitor pain and request assistance  - Assess pain using appropriate pain scale  - Administer analgesics based on type and severity of pain and evaluate response  - Implement non-pharmacological measures as appropriate and evaluate response  - Consider cultural and social influences on pain and pain management  - Manage/alleviate anxiety  - Utilize distraction and/or relaxation techniques  - Monitor for opioid side effects  - Notify MD/LIP if interventions unsuccessful or patient reports new pain  - Anticipate increased pain with activity and pre-medicate as appropriate  Outcome: Progressing     Problem: ANXIETY  Goal: Will report anxiety at manageable levels  Description: INTERVENTIONS:  - Administer medication as ordered  - Teach and rehearse alternative coping skills  - Provide emotional support with 1:1 interaction with staff  Outcome: Progressing     Problem: Patient/Family Goals  Goal: Patient/Family Long Term Goal  Description: Patient's Long Term Goal: Uncomplicated  section    Interventions:  VS per protocol  EFM per protocol  I&O  Smith catheter  Abd prep with clippers as needed  SCD to bilateral lower extremities  NPO  Insert/maintain IV access  Antibiotics per protocol  Informed consent     - See additional Care Plan goals for specific interventions  Outcome: Progressing  Goal: Patient/Family Short Term Goal  Description: Patient's Short Term Goal: Uncomplicated   section    Interventions:  VS per protocol  I&O  EFM per protocol  Maintain IV as ordered  Antibiotics as needed per protocol  Informed consent     - See additional Care Plan goals for specific interventions  Outcome: Progressing

## 2024-01-03 ENCOUNTER — APPOINTMENT (OUTPATIENT)
Dept: CT IMAGING | Facility: HOSPITAL | Age: 39
End: 2024-01-03
Attending: HOSPITALIST
Payer: COMMERCIAL

## 2024-01-03 ENCOUNTER — APPOINTMENT (OUTPATIENT)
Dept: CV DIAGNOSTICS | Facility: HOSPITAL | Age: 39
End: 2024-01-03
Attending: HOSPITALIST
Payer: COMMERCIAL

## 2024-01-03 PROBLEM — I48.91 ATRIAL FIBRILLATION WITH RVR (HCC): Status: ACTIVE | Noted: 2024-01-02

## 2024-01-03 PROBLEM — Z98.891 H/O CESAREAN SECTION: Status: ACTIVE | Noted: 2024-01-03

## 2024-01-03 PROBLEM — D72.829 LEUKOCYTOSIS: Status: ACTIVE | Noted: 2024-01-03

## 2024-01-03 LAB
ATRIAL RATE: 119 BPM
BASOPHILS # BLD AUTO: 0.02 X10(3) UL (ref 0–0.2)
BASOPHILS NFR BLD AUTO: 0.1 %
EOSINOPHIL # BLD AUTO: 0.08 X10(3) UL (ref 0–0.7)
EOSINOPHIL NFR BLD AUTO: 0.6 %
ERYTHROCYTE [DISTWIDTH] IN BLOOD BY AUTOMATED COUNT: 13.8 %
HCT VFR BLD AUTO: 31.8 %
HGB BLD-MCNC: 10.5 G/DL
IMM GRANULOCYTES # BLD AUTO: 0.11 X10(3) UL (ref 0–1)
IMM GRANULOCYTES NFR BLD: 0.8 %
LYMPHOCYTES # BLD AUTO: 2.15 X10(3) UL (ref 1–4)
LYMPHOCYTES NFR BLD AUTO: 15.6 %
MAGNESIUM SERPL-MCNC: 2 MG/DL (ref 1.6–2.6)
MCH RBC QN AUTO: 28 PG (ref 26–34)
MCHC RBC AUTO-ENTMCNC: 33 G/DL (ref 31–37)
MCV RBC AUTO: 84.8 FL
MONOCYTES # BLD AUTO: 1.13 X10(3) UL (ref 0.1–1)
MONOCYTES NFR BLD AUTO: 8.2 %
NEUTROPHILS # BLD AUTO: 10.33 X10 (3) UL (ref 1.5–7.7)
NEUTROPHILS # BLD AUTO: 10.33 X10(3) UL (ref 1.5–7.7)
NEUTROPHILS NFR BLD AUTO: 74.7 %
PLATELET # BLD AUTO: 219 10(3)UL (ref 150–450)
POTASSIUM SERPL-SCNC: 4.5 MMOL/L (ref 3.5–5.1)
Q-T INTERVAL: 282 MS
QRS DURATION: 64 MS
QTC CALCULATION (BEZET): 407 MS
R AXIS: 22 DEGREES
RBC # BLD AUTO: 3.75 X10(6)UL
T AXIS: 14 DEGREES
VENTRICULAR RATE: 125 BPM
WBC # BLD AUTO: 13.8 X10(3) UL (ref 4–11)

## 2024-01-03 PROCEDURE — 99232 SBSQ HOSP IP/OBS MODERATE 35: CPT | Performed by: INTERNAL MEDICINE

## 2024-01-03 PROCEDURE — 93306 TTE W/DOPPLER COMPLETE: CPT | Performed by: HOSPITALIST

## 2024-01-03 PROCEDURE — 71275 CT ANGIOGRAPHY CHEST: CPT | Performed by: HOSPITALIST

## 2024-01-03 NOTE — PROGRESS NOTES
Patient's heart rate ranged from 140's to 30's. OB notified. Ordered EKG. OB to bedside. EKG results required cardiology consult. OB spoke with Cardiologist, who ordered bolus and transfer to cardiac floor. Report given to cardiac nurse. Will call when ready for transfer.

## 2024-01-03 NOTE — PLAN OF CARE
Assumed patient care around 0730 this AM. Patient A&O x4,  at bedside. SPO2 maintained on RA, no c/o SOB. Afib on tele, HR controlled on Diltiazem gtt. Smith in place draining clear yellow urine. No BM since birth, receiving colace. L&D RN rounded this AM to assess and bring baby for skin to skin. Denies pain at this time, receiving scheduled Tylenol. IVF discontinued. SBA for safety.     1230: Converted to NSR. Cardiology aware. Weaned off Dilt gtt.   1330: Smith removed.     Problem: CARDIOVASCULAR - ADULT  Goal: Maintains optimal cardiac output and hemodynamic stability  Description: INTERVENTIONS:  - Monitor vital signs, rhythm, and trends  - Monitor for bleeding, hypotension and signs of decreased cardiac output  - Evaluate effectiveness of vasoactive medications to optimize hemodynamic stability  - Monitor arterial and/or venous puncture sites for bleeding and/or hematoma  - Assess quality of pulses, skin color and temperature  - Assess for signs of decreased coronary artery perfusion - ex. Angina  - Evaluate fluid balance, assess for edema, trend weights  Outcome: Progressing  Goal: Absence of cardiac arrhythmias or at baseline  Description: INTERVENTIONS:  - Continuous cardiac monitoring, monitor vital signs, obtain 12 lead EKG if indicated  - Evaluate effectiveness of antiarrhythmic and heart rate control medications as ordered  - Initiate emergency measures for life threatening arrhythmias  - Monitor electrolytes and administer replacement therapy as ordered  Outcome: Progressing     Problem: POSTPARTUM  Goal: Long Term Goal:Experiences normal postpartum course  Description: INTERVENTIONS:  - Assess and monitor vital signs and lab values.  - Assess fundus and lochia.  - Provide ice/sitz baths for perineum discomfort.  - Monitor healing of incision/episiotomy/laceration, and assess for signs and symptoms of infection and hematoma.  - Assess bladder function and monitor for bladder distention.  -  Provide/instruct/assist with pericare as needed.  - Provide VTE prophylaxis as needed.  - Monitor bowel function.  - Encourage ambulation and provide assistance as needed.  - Assess and monitor emotional status and provide social service/psych resources as needed.  - Utilize standard precautions and use personal protective equipment as indicated. Ensure aseptic care of all intravenous lines and invasive tubes/drains.  - Obtain immunization and exposure to communicable diseases history.  Outcome: Progressing  Goal: Optimize infant feeding at the breast  Description: INTERVENTIONS:  - Initiate breast feeding within first hour after birth.   - Monitor effectiveness of current breast feeding efforts.  - Assess support systems available to mother/family.  - Identify cultural beliefs/practices regarding lactation, letdown techniques, maternal food preferences.  - Assess mother's knowledge and previous experience with breast feeding.  - Provide information as needed about early infant feeding cues (e.g., rooting, lip smacking, sucking fingers/hand) versus late cue of crying.  - Discuss/demonstrate breast feeding aids (e.g., infant sling, nursing footstool/pillows, and breast pumps).  - Encourage mother/other family members to express feelings/concerns, and actively listen.  - Educate father/SO about benefits of breast feeding and how to manage common lactation challenges.  - Recommend avoidance of specific medications or substances incompatible with breast feeding.  - Assess and monitor for signs of nipple pain/trauma.  - Instruct and provide assistance with proper latch.  - Review techniques for milk expression (breast pumping) and storage of breast milk. Provide pumping equipment/supplies, instructions and assistance, as needed.  - Encourage rooming-in and breast feeding on demand.  - Encourage skin-to-skin contact.  - Provide LC support as needed.  - Assess for and manage engorgement.  - Provide breast feeding education  handouts and information on community breast feeding support.   Outcome: Progressing  Goal: Appropriate maternal -  bonding  Description: INTERVENTIONS:  - Assess caregiver- interactions.  - Assess caregiver's emotional status and coping mechanisms.  - Encourage caregiver to participate in  daily care.  - Assess support systems available to mother/family.  - Provide /case management support as needed.  Outcome: Progressing

## 2024-01-03 NOTE — PROGRESS NOTES
Patient transferred to room 8600 in stable condition. This RN assessed fundus, bleeding, and dressing with RN taking over care Bailey. Bailey aware of charge RN number should there be any questions.

## 2024-01-03 NOTE — PROGRESS NOTES
[facility]     Hospitalist Progress Note     Lucy Hoffman Patient Status:  Inpatient    1985 MRN [id number]   Location [facility] 8NE-A Attending Kari Wiley MD   Hosp Day # 1 PCP aNvin Shah MD     Reason for consult: medical management, afib  Requested by: Kari Wiley MD      Subjective:     Patient feeling well, no complaints; denies any palpitations, chest pain/pressure, SOB. Remains on dilt drip.  at beside     Objective:    Review of Systems:   A comprehensive review of systems was completed; pertinent positive and negatives stated in subjective.    Vital signs:  Temp:  [97.7 °F (36.5 °C)-98.5 °F (36.9 °C)] 98.2 °F (36.8 °C)  Pulse:  [] 106  Resp:  [17-23] 18  BP: ()/(59-95) 119/68  SpO2:  [91 %-98 %] 95 %    Physical Exam:    General: No acute distress  Respiratory: no wheezes, no rhonchi  Cardiovascular: IRRR  Abdomen: Soft, Non-tender, non-distended, positive bowel sounds  Neuro: No new focal deficits.   Extremities: no edema      Diagnostic Data:    Labs:  Recent Labs   Lab 24  1210 24  1951 24  0428   WBC 9.7 15.5* 13.8*   HGB 12.9 11.5* 10.5*   MCV 84.1 85.9 84.8   .0 199.0 219.0       Recent Labs   Lab 24  2225 24  0428   GLU 67*  --    BUN 8*  --    CREATSERUM 0.35*  --    CA 8.2*  --      --    K 3.8  3.8 4.5     --    CO2 21.0  --        Estimated Creatinine Clearance: 175.7 mL/min (A) (based on SCr of 0.35 mg/dL (L)).    No results for input(s): \"TROP\", \"TROPHS\", \"CK\" in the last 168 hours.    No results for input(s): \"PTP\", \"INR\" in the last 168 hours.    Lab Results   Component Value Date    TSH 2.540 2024             Microbiology    No results found for this visit on 24.      Imaging: Reviewed in Epic.    Medications:    acetaminophen  1,000 mg Oral Q6H    docusate sodium  100 mg Oral BID@0600,1800       Assessment & Plan:      #New onset A.fib with RVR  -BB started  -ECHO ordered  -wean  diltiazem drip as tolerated  -CTA negative for PE  -no significant blood loss during procedure  - cardiology consulted > plan for cardiac monitor at discharge      # s/p  24    #Leukocytosis, likely d/t pregnancy/  - improving       Rosie Brown,     Supplementary Documentation:     Quality:  DVT Mechanical Prophylaxis:   SCDs, Early ambuation  DVT Pharmacologic Prophylaxis   Medication   None                Code Status: Not on file  Smith: No urinary catheter in place      The  Century Cures Act makes medical notes like these available to patients in the interest of transparency. Please be advised this is a medical document. Medical documents are intended to carry relevant information, facts as evident, and the clinical opinion of the practitioner. The medical note is intended as peer to peer communication and may appear blunt or direct. It is written in medical language and may contain abbreviations or verbiage that are unfamiliar.

## 2024-01-03 NOTE — CM/SW NOTE
01/03/24 1200   Referral Data   Referral Source Other (Comment)  (Floor SW)   Referral Reason Discharge planning   Social Situation   Patient lives with Spouse   Marital status    Support System Immediate family   Car seat Yes   Needs breast pump No   M/B Discharge Plan   Pediatrician Rere Hull DO   Patient agrees with plan Yes     M/B SW, along with floor SW met with pt to complete assessment, and offer support. RN caring for pt also present. Pt transferred from M/B to floor for A.fib w/ RVR new onset.    Pt presented with a cheerful affect. Pt sps at bedside sleeping. Pt reports she lives in [place] with her sps, and this is their first baby. Pt reports strong support from family, and sps, reports she has a sister who lives nearby. Pt reports she plans to add baby to [employer] PPO plan, and has chosen pediatrician, Rere Hull DO. Pt reports she obtained breast pump through insurance, and has car seat for baby.    Pt denies any hx of anxiety, or depression. EPDS completed after delivery not available, however PHQ4 scored 0. Pt denies any immediate PPA/PPD concerns. SW offered support, and provided PMAD's handout. Pt reports no further PPA/PPD questions, or concerns.     Encouraged pt to reach out to SW if need arises. Pt agreeable to plan.    SW to remain available for dc planning, and/or additional need for support.    Casey Ding, RAJWINDER  Discharge Planner  [id number]

## 2024-01-03 NOTE — PROGRESS NOTES
[facility] 8NE-A pepito Hoffman Patient Status:  Inpatient   Age/Gender 38 year old female MRN [id number]   Location [facility] 8NE-A Attending Kari Wiley MD   Hosp Day # 1 PCP Navin Shah MD      Anesthesia Pain Progress Note        Anesthesia Technique:   Spinal anesthesia, with neuraxial morphine (pf) for post op pain.   Patient is awake, alert,  and infant at bedside.    As noted, underwent uneventful neuraxial anesthesia with pf morphine.  Post procedure, after transfer to floor, demonstrated new onset atrial fibrillation.    As noted, managed on cardiac tele floor and echo performed this am.  Pain Management Technique:  In addition to available oral supplemental and IV medications  Patient received neuraxial preservative free morphine for post procedural pain control.    Post Procedure Pain Quality:    Adequate    Pain Management Side Effects:  Pruritis improving without treatment.     /66 (BP Location: Right arm)   Pulse 96   Temp 97.7 °F (36.5 °C) (Oral)   Resp 20   Wt 75.2 kg (165 lb 12.8 oz)   LMP 2023   SpO2 95%   Breastfeeding Yes   BMI 30.33 kg/m²       Injection Site:  No back pain, reports of issues with injections site.     Complications from Pain Management or Anesthesia:   None  Denies incisional pain at this time, \"0/10.\"  All patient questions were answered.  Follow up pain management is separate from intraoperative anesthetic needs.  Pain care is transitioned to primary service, with management by oral medications.    Thank you for asking us to participate in the care of your patient.    Luis Armando Rogers MD, 24, 9:43 AM      Luis Armando Rogers MD, 24, 9:43 AM

## 2024-01-03 NOTE — PROGRESS NOTES
Pt found to have irregular HR - no other complaints - no S.O.B. - 12 lead EKG done - found to have Afib with rapid ventricular response -   /72 (BP Location: Left arm)   Pulse (!) 126   Temp 97.5 °F (36.4 °C) (Oral)   Resp 18   Wt 165 lb 12.8 oz (75.2 kg)   LMP 04/04/2023   SpO2 94%   Breastfeeding Yes   BMI 30.33 kg/m²   Pt very comfortable - denies sx's of Chest pain, SOB   I explained along with the cardiologist that she will be transferred and will require conversion to normal rhythm - this will be managed by cardiology.  I spoke with Dr. Shah - he agrees - transfer to cardiology and he will manage her from this point on - requests 500cc bolus

## 2024-01-03 NOTE — CONSULTS
[facility] HOSPITALIST  CONSULT     Lucy Hoffman Patient Status:  Inpatient    1985 MRN [id number]   Location [facility] 2SW-J Attending Kari Wiley MD   Hosp Day # 0 PCP No primary care provider on file.     Reason for consult: new A.fib with RVR    Requested by: Dr. Wiley    Subjective:   History of Present Illness:     Lucy Hoffman is a 38 year old female with no medical history who had a a primary low transverse  today.  Post procedure she was noted to have A.fib with RVR.  Patient is complaining of some anxiety but no chest pain, shortness of breath, headaches or abdominal pain.  She reports feeling a little lightheaded at times as well.       History/Other:    Past Medical History:  History reviewed. No pertinent past medical history.  Past Surgical History:   History reviewed. No pertinent surgical history.   Family History:   No family history on file.  Social History:    reports that she has never smoked. She has never used smokeless tobacco. She reports that she does not currently use alcohol. She reports that she does not use drugs.     Allergies: No Known Allergies    Medications:    Current Facility-Administered Medications on File Prior to Encounter   Medication Dose Route Frequency Provider Last Rate Last Admin    [COMPLETED] terbutaline (Brethine) 1 MG/ML injection 0.25 mg  0.25 mg Subcutaneous Once Nisreen Perdomo MD   0.25 mg at 23 1045     Current Outpatient Medications on File Prior to Encounter   Medication Sig Dispense Refill    prenatal vitamin with DHA 27-0.8-228 MG Oral Cap Take 1 capsule by mouth daily.      ferrous sulfate 325 (65 FE) MG Oral Tab EC Take 1 tablet (325 mg total) by mouth daily with breakfast.         Review of Systems:   A comprehensive review of systems was completed.    Pertinent positives and negatives noted in the HPI.    Objective:   Physical Exam:    /72 (BP Location: Left arm)   Pulse (!) 126   Temp 97.5 °F (36.4 °C) (Oral)    Resp 18   Wt 165 lb 12.8 oz (75.2 kg)   LMP 2023   SpO2 94%   Breastfeeding Yes   BMI 30.33 kg/m²   General: No acute distress, Alert  Respiratory: No rhonchi, no wheezes  Cardiovascular: irreg irreg  Abdomen: Soft, NT/ND, +BS  Neuro: No new focal deficits  Extremities: No edema      Results:    Labs:      Labs Last 24 Hours:  Recent Labs   Lab 24  1210   WBC 9.7   HGB 12.9   MCV 84.1   .0       No results for input(s): \"GLU\", \"BUN\", \"CREATSERUM\", \"GFRAA\", \"GFRNAA\", \"CA\", \"ALB\", \"NA\", \"K\", \"CL\", \"CO2\", \"ALKPHO\", \"AST\", \"ALT\", \"BILT\", \"TP\" in the last 168 hours.    No results for input(s): \"PTP\", \"INR\" in the last 168 hours.    No results for input(s): \"TROP\", \"CK\" in the last 168 hours.      Imaging: Imaging data reviewed in Epic.    Assessment & Plan:      #A.fib with RVR- new onset  -BB  -ECHO  -Cardiology consult  -check TSH, Mag  -CTA to rule out PE  -continue IVF  -no significant blood loss during procedure    # s/p  24        Plan of care discussed with patient, RN and Dr. Wiley    Will transfer to Cardiac Tele    Camilla Regan MD  2024    The 21st Century Cures Act makes medical notes like these available to patients in the interest of transparency. Please be advised this is a medical document. Medical documents are intended to carry relevant information, facts as evident, and the clinical opinion of the practitioner. The medical note is intended as peer to peer communication and may appear blunt or direct. It is written in medical language and may contain abbreviations or verbiage that are unfamiliar.

## 2024-01-03 NOTE — PROGRESS NOTES
OB Progress Note POD#1  S: She is feeling well. Moderate VB. Pain controlled. Breastfeeding/pumping.    O:  Blood pressure 109/66, pulse 96, temperature 97.7 °F (36.5 °C), temperature source Oral, resp. rate 20, weight 165 lb 12.8 oz (75.2 kg), last menstrual period 04/04/2023, SpO2 95%, currently breastfeeding.    Intake/Output Summary (Last 24 hours) at 1/3/2024 1059  Last data filed at 1/3/2024 0745  Gross per 24 hour   Intake 1240 ml   Output 3575 ml   Net -2335 ml       Gen: NAD, AAOx3  Exam per RN  Breasts: soft, nontender, nonerythematous  Heart: RRR  Lungs: Unlabored breathing, no wheezing  Abdomen: soft, minimally tender, nondistended, incision C/D/I  Gyne: minimal lochia  Ext: trace pitting edema    Lab Results   Component Value Date    WBC 13.8 01/03/2024    HGB 10.5 01/03/2024    HCT 31.8 01/03/2024    .0 01/03/2024    CREATSERUM 0.35 01/02/2024    BUN 8 01/02/2024     01/02/2024    K 4.5 01/03/2024     01/02/2024    CO2 21.0 01/02/2024    GLU 67 01/02/2024    CA 8.2 01/02/2024    TSH 2.540 01/02/2024    MG 2.0 01/03/2024       A/P: POD #1 s/p LTCS with postpartum atrial fibrillation  1) Pain: [place] PRN. Toradol for first 24 hours then ibuprofen scheduled  2) Breastfeeding: lactation consult PRN; pt given encouragement. Diltiazem is ok for breastfeeding  3) CV/resp:   -on telemetry for atrial fibrillation  -on diltiazem IV  -hospitalist and cardiology following  -labs WNL, echo done  4) GI: general diet  5) /gyne: normal UOP, will d/c duron this AM. Normal lochia  6) Heme: asymptomatic anemia  7) DVT ppx: SCDs; advised to ambulate with assistance later today  -per hospitalist and cardiology no medical anticoagulation needed  8) Fluids/elec: saline lock today  Continue inpatient observation    Kailee Almonte MD  [facility]  Contact via [employer]

## 2024-01-03 NOTE — PLAN OF CARE
Received pt from [facility] for afib with RVR. HR in the 160s. Pt asymptomatic. BP WNL. Dr. Shah paged for orders. IVP cardizem given, cardizem gtt started and titrated per protocol. Mag and K+ replaced per cardiac electrolyte protocol.   Abdominal incision covered with dressing, no bleeding noted. Minimal vaginal bleeding. OB charge RN assessing q4hrs.   Smith draining light yellow, clear urine.   Up with SBA    Radiologist called with CT results. No PE    0400: HR sustaining 80s-90s on 15mg of cardizem. Still afib        Problem: POSTPARTUM  Goal: Long Term Goal:Experiences normal postpartum course  Description: INTERVENTIONS:  - Assess and monitor vital signs and lab values.  - Assess fundus and lochia.  - Provide ice/sitz baths for perineum discomfort.  - Monitor healing of incision/episiotomy/laceration, and assess for signs and symptoms of infection and hematoma.  - Assess bladder function and monitor for bladder distention.  - Provide/instruct/assist with pericare as needed.  - Provide VTE prophylaxis as needed.  - Monitor bowel function.  - Encourage ambulation and provide assistance as needed.  - Assess and monitor emotional status and provide social service/psych resources as needed.  - Utilize standard precautions and use personal protective equipment as indicated. Ensure aseptic care of all intravenous lines and invasive tubes/drains.  - Obtain immunization and exposure to communicable diseases history.  Outcome: Progressing  Goal: Optimize infant feeding at the breast  Description: INTERVENTIONS:  - Initiate breast feeding within first hour after birth.   - Monitor effectiveness of current breast feeding efforts.  - Assess support systems available to mother/family.  - Identify cultural beliefs/practices regarding lactation, letdown techniques, maternal food preferences.  - Assess mother's knowledge and previous experience with breast feeding.  - Provide information as needed about early infant  feeding cues (e.g., rooting, lip smacking, sucking fingers/hand) versus late cue of crying.  - Discuss/demonstrate breast feeding aids (e.g., infant sling, nursing footstool/pillows, and breast pumps).  - Encourage mother/other family members to express feelings/concerns, and actively listen.  - Educate father/SO about benefits of breast feeding and how to manage common lactation challenges.  - Recommend avoidance of specific medications or substances incompatible with breast feeding.  - Assess and monitor for signs of nipple pain/trauma.  - Instruct and provide assistance with proper latch.  - Review techniques for milk expression (breast pumping) and storage of breast milk. Provide pumping equipment/supplies, instructions and assistance, as needed.  - Encourage rooming-in and breast feeding on demand.  - Encourage skin-to-skin contact.  - Provide LC support as needed.  - Assess for and manage engorgement.  - Provide breast feeding education handouts and information on community breast feeding support.   Outcome: Progressing  Goal: Appropriate maternal -  bonding  Description: INTERVENTIONS:  - Assess caregiver- interactions.  - Assess caregiver's emotional status and coping mechanisms.  - Encourage caregiver to participate in  daily care.  - Assess support systems available to mother/family.  - Provide /case management support as needed.  Outcome: Progressing     Problem: CARDIOVASCULAR - ADULT  Goal: Maintains optimal cardiac output and hemodynamic stability  Description: INTERVENTIONS:  - Monitor vital signs, rhythm, and trends  - Monitor for bleeding, hypotension and signs of decreased cardiac output  - Evaluate effectiveness of vasoactive medications to optimize hemodynamic stability  - Monitor arterial and/or venous puncture sites for bleeding and/or hematoma  - Assess quality of pulses, skin color and temperature  - Assess for signs of decreased coronary artery perfusion -  ex. Angina  - Evaluate fluid balance, assess for edema, trend weights  Outcome: Progressing  Goal: Absence of cardiac arrhythmias or at baseline  Description: INTERVENTIONS:  - Continuous cardiac monitoring, monitor vital signs, obtain 12 lead EKG if indicated  - Evaluate effectiveness of antiarrhythmic and heart rate control medications as ordered  - Initiate emergency measures for life threatening arrhythmias  - Monitor electrolytes and administer replacement therapy as ordered  Outcome: Progressing

## 2024-01-03 NOTE — CONSULTS
[facility] Cardiology  Consultation Note      Lucy Hoffman Patient Status:  Inpatient    1985 MRN [id number]   [facility] 8NE-A Attending Kari Wiley MD   Hosp Day # 1 PCP Navin Shah MD     Reason for consult: Atrial fibrillation    History of Present Illness:  Lucy Hoffman is a 38 year old female with no prior CV history who underwent uncomplicated C section yesterday. Post procedure found to be in AF RVR. Denies any symptoms from this - no CP, SOB, palpitations, LH. No known history of arrhythmias in her or her family. This morning on diltiazem drip, spontaneously converted to SR. Currently feels well without complaints.     Medications:  Current Facility-Administered Medications   Medication Dose Route Frequency    lactated ringers infusion   Intravenous Continuous    naloxone (Narcan) 0.4 MG/ML injection 0.08 mg  0.08 mg Intravenous Q5 Min PRN    ondansetron (Zofran) 4 MG/2ML injection 4 mg  4 mg Intravenous Q6H PRN    diphenhydrAMINE (Benadryl) 50 mg/mL  injection 12.5 mg  12.5 mg Intravenous Q4H PRN    Or    diphenhydrAMINE (Benadryl) cap/tab 25 mg  25 mg Oral Q4H PRN    nalbuphine (Nubain) 10 mg/mL injection 2.5 mg  2.5 mg Intravenous Q4H PRN    metoclopramide (Reglan) 5 mg/mL injection 10 mg  10 mg Intravenous Q6H PRN    dextrose in lactated ringers 5% infusion   Intravenous Continuous PRN    acetaminophen (Tylenol Extra Strength) tab 1,000 mg  1,000 mg Oral Q6H    ketorolac (Toradol) 30 MG/ML injection 30 mg  30 mg Intravenous Q6H    witch hazel-glycerin ( WITCH HAZEL) external pad   Topical PRN    phenylephrine-min oil-benigno (Formula R) 0.25-14-74.9 % rectal ointment   Rectal Daily PRN    simethicone (Mylicon) chewable tab 80 mg  80 mg Oral TID PRN    docusate sodium (Colace) cap 100 mg  100 mg Oral BID@0600,1800    magnesium hydroxide (Milk of Magnesia) 400 MG/5ML oral suspension 30 mL  30 mL Oral Daily PRN    bisacodyl (Dulcolax) 10 MG rectal suppository 10 mg  10 mg  Rectal Once PRN    HYDROcodone-acetaminophen (Norco) 5-325 MG per tab 1 tablet  1 tablet Oral Q6H PRN    Or    HYDROcodone-acetaminophen (Norco) 5-325 MG per tab 2 tablet  2 tablet Oral Q6H PRN    sodium chloride 0.9% infusion   Intravenous Continuous    acetaminophen (Tylenol Extra Strength) tab 500 mg  500 mg Oral Q4H PRN    metoprolol (Lopressor) 5 mg/5mL injection 5 mg  5 mg Intravenous Q6H PRN    dilTIAZem (cardIZEM) 100 mg in sodium chloride 0.9% 100 mL IVPB-ADDV  2.5-20 mg/hr Intravenous Continuous       History reviewed. No pertinent past medical history.    History reviewed. No pertinent surgical history.    Family History  family history is not on file.    Social History   reports that she has never smoked. She has never used smokeless tobacco. She reports that she does not currently use alcohol. She reports that she does not use drugs.     Allergies  No Known Allergies    Review of Systems:  As per HPI, otherwise 10 point ROS is negative in detail.    Physical Exam:  Blood pressure 104/62, pulse 96, temperature 98.5 °F (36.9 °C), temperature source Oral, resp. rate 20, weight 165 lb 12.8 oz (75.2 kg), last menstrual period 2023, SpO2 91%, currently breastfeeding.  Temp (24hrs), Av.9 °F (36.6 °C), Min:97.4 °F (36.3 °C), Max:98.5 °F (36.9 °C)    Wt Readings from Last 3 Encounters:   24 165 lb 12.8 oz (75.2 kg)   23 164 lb 12.8 oz (74.8 kg)       General: Awake and alert; in no acute distress  HEENT: Extraocular movements are intact; sclerae are anicteric; scalp is atraumatic  Neck: Supple; no JVD; no carotid bruits  Cardiac: Regular rate and regular rhythm; normal S1 and S2, no murmurs, rubs, or gallops are appreciated  Lungs: Clear to auscultation bilaterally; no accessory muscle use is noted, no wheezes, rhonci or rales  Abdomen: Soft, non-distended, non-tender; bowel sounds are normoactive  Extremities: Warm, no edema, clubbing or cyanosis; moves all 4 extremities normally, distal  pulses intact and equal  Psychiatric: Normal mood and affect; answers questions appropriately  Dermatologic: No rashes; normal skin turgor    Diagnostic testing:    Labs:   No results found for: \"PT\", \"INR\"     Lab Results   Component Value Date    WBC 13.8 01/03/2024    HGB 10.5 01/03/2024    HCT 31.8 01/03/2024    .0 01/03/2024    CREATSERUM 0.35 01/02/2024    BUN 8 01/02/2024     01/02/2024    K 4.5 01/03/2024     01/02/2024    CO2 21.0 01/02/2024    GLU 67 01/02/2024    CA 8.2 01/02/2024    TSH 2.540 01/02/2024    MG 2.0 01/03/2024       Cardiac diagnostics:    EKG 1/3/2024: AF RVR    Echo 1/3/2024:  1. Left ventricle: The cavity size was normal. Wall thickness was normal.      Systolic function was normal. The estimated ejection fraction was 60-65%.      Unable to assess LV diastolic function.   2. Left atrium: The left atrial volume was moderately increased.   3. Pulmonary arteries: Systolic pressure was within the normal range,      estimated to be 28mm Hg.   Impressions:  No previous study was available for comparison.     Impression:  38 year old female s/p C- section noted to be in AF RVR, now spontaneously converted to SR  Moderate LAE on echo    Recommendations:  Can stop diltiazem drip. As no prior history of AF, will not start her on oral jaren blockers for now, unless she has recurrence.   CHADS VASC is 1 only for female, low risk for thromboembolism, thus will not start OAC.   Moderate LAE is curious, no history of HTN. Consider whether she has had occult AF in the past. Will place a 2 week cardiac monitor at future follow up - not urgent, will let her recover and breast feed without disruption.   OK for dc from CV standpoint whenever planned by OB.     Thank you for allowing our practice to participate in the care of your patient. Please do not hesitate to contact me if you have any questions.    Stuart Engel MD  Interventional Cardiology  [facility]  Office:  [phone]    1/3/2024  1:34 PM    Total encounter time 80 minutes.

## 2024-01-04 LAB — T PALLIDUM AB SER QL IA: NONREACTIVE

## 2024-01-04 PROCEDURE — 99232 SBSQ HOSP IP/OBS MODERATE 35: CPT | Performed by: INTERNAL MEDICINE

## 2024-01-04 NOTE — PROGRESS NOTES
[facility]  Consultation Note      Lucy Hoffman Patient Status:  Inpatient    1985 MRN [id number]   [facility] 8NE-A Attending Kari Wiley MD   Hosp Day # 2 PCP Navin Shah MD     Reason for consult: Atrial fibrillation    Subjective: No CP, SOB or palpiations    Impression:  38 year old female s/p C- section noted to be in AF RVR, now spontaneously converted to SR  Moderate LAE on echo    Recommendations:  Stable off diltiazem drip. As no prior history of AF, will not start her on oral jaren blockers for now, unless she has recurrence.   CHADS VASC is 1 only for female, low risk for thromboembolism, thus will not start OAC.   Moderate LAE is curious, no history of HTN. Consider whether she has had occult AF in the past. Will place a 2 week cardiac monitor at future follow up - not urgent, will let her recover and breast feed without disruption.   OK for dc from CV standpoint whenever planned by OB.     History of Present Illness:  Lucy Hoffman is a 38 year old female with no prior CV history who underwent uncomplicated C section yesterday. Post procedure found to be in AF RVR. Denies any symptoms from this - no CP, SOB, palpitations, LH. No known history of arrhythmias in her or her family. This morning on diltiazem drip, spontaneously converted to SR. Currently feels well without complaints.     Medications:        History reviewed. No pertinent past medical history.    History reviewed. No pertinent surgical history.    Family History  family history is not on file.    Social History   reports that she has never smoked. She has never used smokeless tobacco. She reports that she does not currently use alcohol. She reports that she does not use drugs.     Allergies  No Known Allergies    Review of Systems:  As per HPI, otherwise 10 point ROS is negative in detail.    Physical Exam:  Blood pressure 130/85, pulse 82, temperature 98.2 °F (36.8 °C), temperature source Oral,  resp. rate 18, weight 165 lb 12.8 oz (75.2 kg), last menstrual period 2023, SpO2 99%, currently breastfeeding.  Temp (24hrs), Av.3 °F (36.8 °C), Min:98.1 °F (36.7 °C), Max:98.7 °F (37.1 °C)    Wt Readings from Last 3 Encounters:   24 165 lb 12.8 oz (75.2 kg)   23 164 lb 12.8 oz (74.8 kg)       General: Awake and alert; in no acute distress  HEENT: Extraocular movements are intact; sclerae are anicteric; scalp is atraumatic  Neck: Supple; no JVD; no carotid bruits  Cardiac: Regular rate and regular rhythm; normal S1 and S2, no murmurs, rubs, or gallops are appreciated  Lungs: Clear to auscultation bilaterally; no accessory muscle use is noted, no wheezes, rhonci or rales  Abdomen: Soft, non-distended, non-tender; bowel sounds are normoactive  Extremities: Warm, no edema, clubbing or cyanosis; moves all 4 extremities normally, distal pulses intact and equal  Psychiatric: Normal mood and affect; answers questions appropriately  Dermatologic: No rashes; normal skin turgor    Diagnostic testing:    Labs:   No results found for: \"PT\", \"INR\"            Cardiac diagnostics:    EKG 1/3/2024: AF RVR    Echo 1/3/2024:  1. Left ventricle: The cavity size was normal. Wall thickness was normal.      Systolic function was normal. The estimated ejection fraction was 60-65%.      Unable to assess LV diastolic function.   2. Left atrium: The left atrial volume was moderately increased.   3. Pulmonary arteries: Systolic pressure was within the normal range,      estimated to be 28mm Hg.   Impressions:  No previous study was available for comparison.       Thank you for allowing our practice to participate in the care of your patient. Please do not hesitate to contact me if you have any questions.    Stuart Engel MD  Interventional Cardiology  [facility]  Office: [phone]

## 2024-01-04 NOTE — PLAN OF CARE
Pt is a/o x4. RA. VSS. NSR on tele. No complaint of cardiac symptoms. Incision site clean and dry, steri strips intact. No drainage, redness, or swelling at noted. Mild pain relieved with scheduled and PRN medications. Continent, does not feel urge to pee but is able to void when she sits on toilet. Several small blood clots in toilet after voiding - none noted in pad. No BM yet. Ambulating sba.   Received transfer orders, OK per ancillary services. Report called to Kiki BADILLO. Pt and support person updated on plan of care. All questions answered. All belongings and equipments transported with patient to room 2206.     Problem: POSTPARTUM  Goal: Long Term Goal:Experiences normal postpartum course  Description: INTERVENTIONS:  - Assess and monitor vital signs and lab values.  - Assess fundus and lochia.  - Provide ice/sitz baths for perineum discomfort.  - Monitor healing of incision/episiotomy/laceration, and assess for signs and symptoms of infection and hematoma.  - Assess bladder function and monitor for bladder distention.  - Provide/instruct/assist with pericare as needed.  - Provide VTE prophylaxis as needed.  - Monitor bowel function.  - Encourage ambulation and provide assistance as needed.  - Assess and monitor emotional status and provide social service/psych resources as needed.  - Utilize standard precautions and use personal protective equipment as indicated. Ensure aseptic care of all intravenous lines and invasive tubes/drains.  - Obtain immunization and exposure to communicable diseases history.  Outcome: Progressing  Goal: Optimize infant feeding at the breast  Description: INTERVENTIONS:  - Initiate breast feeding within first hour after birth.   - Monitor effectiveness of current breast feeding efforts.  - Assess support systems available to mother/family.  - Identify cultural beliefs/practices regarding lactation, letdown techniques, maternal food preferences.  - Assess mother's knowledge and  previous experience with breast feeding.  - Provide information as needed about early infant feeding cues (e.g., rooting, lip smacking, sucking fingers/hand) versus late cue of crying.  - Discuss/demonstrate breast feeding aids (e.g., infant sling, nursing footstool/pillows, and breast pumps).  - Encourage mother/other family members to express feelings/concerns, and actively listen.  - Educate father/SO about benefits of breast feeding and how to manage common lactation challenges.  - Recommend avoidance of specific medications or substances incompatible with breast feeding.  - Assess and monitor for signs of nipple pain/trauma.  - Instruct and provide assistance with proper latch.  - Review techniques for milk expression (breast pumping) and storage of breast milk. Provide pumping equipment/supplies, instructions and assistance, as needed.  - Encourage rooming-in and breast feeding on demand.  - Encourage skin-to-skin contact.  - Provide LC support as needed.  - Assess for and manage engorgement.  - Provide breast feeding education handouts and information on community breast feeding support.   Outcome: Progressing  Goal: Appropriate maternal -  bonding  Description: INTERVENTIONS:  - Assess caregiver- interactions.  - Assess caregiver's emotional status and coping mechanisms.  - Encourage caregiver to participate in  daily care.  - Assess support systems available to mother/family.  - Provide /case management support as needed.  Outcome: Progressing     Problem: CARDIOVASCULAR - ADULT  Goal: Maintains optimal cardiac output and hemodynamic stability  Description: INTERVENTIONS:  - Monitor vital signs, rhythm, and trends  - Monitor for bleeding, hypotension and signs of decreased cardiac output  - Evaluate effectiveness of vasoactive medications to optimize hemodynamic stability  - Monitor arterial and/or venous puncture sites for bleeding and/or hematoma  - Assess quality of  pulses, skin color and temperature  - Assess for signs of decreased coronary artery perfusion - ex. Angina  - Evaluate fluid balance, assess for edema, trend weights  Outcome: Progressing  Goal: Absence of cardiac arrhythmias or at baseline  Description: INTERVENTIONS:  - Continuous cardiac monitoring, monitor vital signs, obtain 12 lead EKG if indicated  - Evaluate effectiveness of antiarrhythmic and heart rate control medications as ordered  - Initiate emergency measures for life threatening arrhythmias  - Monitor electrolytes and administer replacement therapy as ordered  Outcome: Progressing

## 2024-01-04 NOTE — PROGRESS NOTES
MOM ADMISSION NOTE:  Report received from Dulce BADILLO   Patient admitted to room in stable condition via: wheelchair     Oriented to room, safety precautions initiated, bed in low position, and call light in reach. Plan of care and safety instructions reviewed, teaching sheets at bedside. VS and assessment initiated.

## 2024-01-04 NOTE — PLAN OF CARE
A&Ox4. SR on tele. HR in the 100s. Continent of bladder and bowel. Stool softener given for BM. Up with SBA.     Problem: POSTPARTUM  Goal: Long Term Goal:Experiences normal postpartum course  Description: INTERVENTIONS:  - Assess and monitor vital signs and lab values.  - Assess fundus and lochia.  - Provide ice/sitz baths for perineum discomfort.  - Monitor healing of incision/episiotomy/laceration, and assess for signs and symptoms of infection and hematoma.  - Assess bladder function and monitor for bladder distention.  - Provide/instruct/assist with pericare as needed.  - Provide VTE prophylaxis as needed.  - Monitor bowel function.  - Encourage ambulation and provide assistance as needed.  - Assess and monitor emotional status and provide social service/psych resources as needed.  - Utilize standard precautions and use personal protective equipment as indicated. Ensure aseptic care of all intravenous lines and invasive tubes/drains.  - Obtain immunization and exposure to communicable diseases history.  Outcome: Progressing  Goal: Optimize infant feeding at the breast  Description: INTERVENTIONS:  - Initiate breast feeding within first hour after birth.   - Monitor effectiveness of current breast feeding efforts.  - Assess support systems available to mother/family.  - Identify cultural beliefs/practices regarding lactation, letdown techniques, maternal food preferences.  - Assess mother's knowledge and previous experience with breast feeding.  - Provide information as needed about early infant feeding cues (e.g., rooting, lip smacking, sucking fingers/hand) versus late cue of crying.  - Discuss/demonstrate breast feeding aids (e.g., infant sling, nursing footstool/pillows, and breast pumps).  - Encourage mother/other family members to express feelings/concerns, and actively listen.  - Educate father/SO about benefits of breast feeding and how to manage common lactation challenges.  - Recommend avoidance of  specific medications or substances incompatible with breast feeding.  - Assess and monitor for signs of nipple pain/trauma.  - Instruct and provide assistance with proper latch.  - Review techniques for milk expression (breast pumping) and storage of breast milk. Provide pumping equipment/supplies, instructions and assistance, as needed.  - Encourage rooming-in and breast feeding on demand.  - Encourage skin-to-skin contact.  - Provide LC support as needed.  - Assess for and manage engorgement.  - Provide breast feeding education handouts and information on community breast feeding support.   Outcome: Progressing  Goal: Appropriate maternal -  bonding  Description: INTERVENTIONS:  - Assess caregiver- interactions.  - Assess caregiver's emotional status and coping mechanisms.  - Encourage caregiver to participate in  daily care.  - Assess support systems available to mother/family.  - Provide /case management support as needed.  Outcome: Progressing     Problem: CARDIOVASCULAR - ADULT  Goal: Maintains optimal cardiac output and hemodynamic stability  Description: INTERVENTIONS:  - Monitor vital signs, rhythm, and trends  - Monitor for bleeding, hypotension and signs of decreased cardiac output  - Evaluate effectiveness of vasoactive medications to optimize hemodynamic stability  - Monitor arterial and/or venous puncture sites for bleeding and/or hematoma  - Assess quality of pulses, skin color and temperature  - Assess for signs of decreased coronary artery perfusion - ex. Angina  - Evaluate fluid balance, assess for edema, trend weights  Outcome: Progressing  Goal: Absence of cardiac arrhythmias or at baseline  Description: INTERVENTIONS:  - Continuous cardiac monitoring, monitor vital signs, obtain 12 lead EKG if indicated  - Evaluate effectiveness of antiarrhythmic and heart rate control medications as ordered  - Initiate emergency measures for life threatening arrhythmias  -  Monitor electrolytes and administer replacement therapy as ordered  Outcome: Progressing

## 2024-01-04 NOTE — PROGRESS NOTES
[facility]     Hospitalist Progress Note     Lucy Hoffman Patient Status:  Inpatient    1985 MRN [id number]   [facility] 8NE-A Attending Kari Wiley MD   Hosp Day # 2 PCP Navin Shah MD     Reason for consult: medical management, afib    Requested by: Kari Wiley MD      Subjective:     Feeling much better this morning, converted to NSR yesterday; off dilt drip and remains in NSR    Objective:    Review of Systems:   A comprehensive review of systems was completed; pertinent positive and negatives stated in subjective.    Vital signs:  Temp:  [98.1 °F (36.7 °C)-98.7 °F (37.1 °C)] 98.1 °F (36.7 °C)  Pulse:  [] 95  Resp:  [18-20] 20  BP: (100-132)/(60-77) 132/77  SpO2:  [95 %-99 %] 99 %    Physical Exam:    General: No acute distress  Respiratory: no wheezes, no rhonchi  Cardiovascular: RRR  Abdomen: Soft, Non-tender, non-distended, positive bowel sounds  Neuro: No new focal deficits.   Extremities: no edema      Diagnostic Data:    Labs:  Recent Labs   Lab 24  1210 24  1951 24  0428   WBC 9.7 15.5* 13.8*   HGB 12.9 11.5* 10.5*   MCV 84.1 85.9 84.8   .0 199.0 219.0       Recent Labs   Lab 24  2225 24  0428   GLU 67*  --    BUN 8*  --    CREATSERUM 0.35*  --    CA 8.2*  --      --    K 3.8  3.8 4.5     --    CO2 21.0  --        Estimated Creatinine Clearance: 175.7 mL/min (A) (based on SCr of 0.35 mg/dL (L)).    No results for input(s): \"TROP\", \"TROPHS\", \"CK\" in the last 168 hours.    No results for input(s): \"PTP\", \"INR\" in the last 168 hours.                 Microbiology    No results found for this visit on 24.      Imaging: Reviewed in Epic.    Medications:    acetaminophen  1,000 mg Oral Q6H    docusate sodium  100 mg Oral BID@0600,1800       Assessment & Plan:      #New onset A.fib with RVR  - converted to NSR  - diltiazem drip weaned  - echo reviewed   - CTA negative for PE  - no significant blood loss during  procedure  - cardiology consulted > plan for cardiac monitor at discharge      # s/p  24    #Leukocytosis, likely d/t pregnancy/  - improving       Rosie Brown,     Supplementary Documentation:     Quality:  DVT Mechanical Prophylaxis:   SCDs, Early ambuation  DVT Pharmacologic Prophylaxis   Medication   None                Code Status: Not on file  Smith: No urinary catheter in place      The  Cures Act makes medical notes like these available to patients in the interest of transparency. Please be advised this is a medical document. Medical documents are intended to carry relevant information, facts as evident, and the clinical opinion of the practitioner. The medical note is intended as peer to peer communication and may appear blunt or direct. It is written in medical language and may contain abbreviations or verbiage that are unfamiliar.

## 2024-01-04 NOTE — PROGRESS NOTES
OB Progress Note POD#2  S: She is feeling well. Minimal VB. Pain controlled. Breastfeeding/pumping. Tolerating po and voiding. Transferred back to [facility] this am. Cleared by cardiology    O:  Blood pressure 130/85, pulse 82, temperature 98.2 °F (36.8 °C), temperature source Oral, resp. rate 18, weight 165 lb 12.8 oz (75.2 kg), last menstrual period 04/04/2023, SpO2 99%, currently breastfeeding.    Intake/Output Summary (Last 24 hours) at 1/4/2024 1701  Last data filed at 1/4/2024 1300  Gross per 24 hour   Intake 480 ml   Output 0 ml   Net 480 ml       Gen: NAD, AAOx3  Exam per RN  Heart: Regular  Lungs: Unlabored breathing, no wheezing  Abdomen: soft, minimally tender, nondistended, incision C/D/I  Gyne: minimal lochia  Ext: trace pitting edema           A/P: POD #2 s/p LTCS with postpartum atrial fibrillation, now resolved  1) Pain: Norco PRN.   2) Breastfeeding: lactation consult PRN; pt given encouragement. Diltiazem is ok for breastfeeding  3) CV/resp:   -s/p  telemetry for atrial fibrillation  -s/p  diltiazem IV  -labs WNL, echo done  -cleared by cardiology and will follow-up in 2 weeks  4) GI: general diet  5) /gyne: voiding spontaneously  6) Heme: asymptomatic anemia  7) DVT ppx: SCDs; ambulating  8) Fluids/elec: saline lock today  Continue inpatient observation    Nisreen Perdomo MD  [facility]  Contact via Perfect Serve

## 2024-01-05 VITALS
HEART RATE: 79 BPM | DIASTOLIC BLOOD PRESSURE: 80 MMHG | WEIGHT: 165.81 LBS | RESPIRATION RATE: 17 BRPM | BODY MASS INDEX: 30 KG/M2 | SYSTOLIC BLOOD PRESSURE: 129 MMHG | TEMPERATURE: 98 F | OXYGEN SATURATION: 98 %

## 2024-01-05 PROCEDURE — 99232 SBSQ HOSP IP/OBS MODERATE 35: CPT | Performed by: INTERNAL MEDICINE

## 2024-01-05 RX ORDER — IBUPROFEN 600 MG/1
600 TABLET ORAL EVERY 6 HOURS PRN
Qty: 60 TABLET | Refills: 0 | Status: SHIPPED | OUTPATIENT
Start: 2024-01-05

## 2024-01-05 RX ORDER — HYDROCODONE BITARTRATE AND ACETAMINOPHEN 5; 325 MG/1; MG/1
1-2 TABLET ORAL EVERY 6 HOURS PRN
Qty: 8 TABLET | Refills: 0 | Status: SHIPPED | OUTPATIENT
Start: 2024-01-05

## 2024-01-05 NOTE — PROGRESS NOTES
Discharge patient home as order. Teaching complete, patient feel comfortable in taking care of herself and  infant. Hugs and kisses off. Send both mom and infant to their family car @ [phone].

## 2024-01-05 NOTE — PLAN OF CARE
Problem: POSTPARTUM  Goal: Long Term Goal:Experiences normal postpartum course  Description: INTERVENTIONS:  - Assess and monitor vital signs and lab values.  - Assess fundus and lochia.  - Provide ice/sitz baths for perineum discomfort.  - Monitor healing of incision/episiotomy/laceration, and assess for signs and symptoms of infection and hematoma.  - Assess bladder function and monitor for bladder distention.  - Provide/instruct/assist with pericare as needed.  - Provide VTE prophylaxis as needed.  - Monitor bowel function.  - Encourage ambulation and provide assistance as needed.  - Assess and monitor emotional status and provide social service/psych resources as needed.  - Utilize standard precautions and use personal protective equipment as indicated. Ensure aseptic care of all intravenous lines and invasive tubes/drains.  - Obtain immunization and exposure to communicable diseases history.  Outcome: Completed  Goal: Optimize infant feeding at the breast  Description: INTERVENTIONS:  - Initiate breast feeding within first hour after birth.   - Monitor effectiveness of current breast feeding efforts.  - Assess support systems available to mother/family.  - Identify cultural beliefs/practices regarding lactation, letdown techniques, maternal food preferences.  - Assess mother's knowledge and previous experience with breast feeding.  - Provide information as needed about early infant feeding cues (e.g., rooting, lip smacking, sucking fingers/hand) versus late cue of crying.  - Discuss/demonstrate breast feeding aids (e.g., infant sling, nursing footstool/pillows, and breast pumps).  - Encourage mother/other family members to express feelings/concerns, and actively listen.  - Educate father/SO about benefits of breast feeding and how to manage common lactation challenges.  - Recommend avoidance of specific medications or substances incompatible with breast feeding.  - Assess and monitor for signs of nipple  pain/trauma.  - Instruct and provide assistance with proper latch.  - Review techniques for milk expression (breast pumping) and storage of breast milk. Provide pumping equipment/supplies, instructions and assistance, as needed.  - Encourage rooming-in and breast feeding on demand.  - Encourage skin-to-skin contact.  - Provide LC support as needed.  - Assess for and manage engorgement.  - Provide breast feeding education handouts and information on community breast feeding support.   Outcome: Completed  Goal: Appropriate maternal -  bonding  Description: INTERVENTIONS:  - Assess caregiver- interactions.  - Assess caregiver's emotional status and coping mechanisms.  - Encourage caregiver to participate in  daily care.  - Assess support systems available to mother/family.  - Provide /case management support as needed.  Outcome: Completed     Problem: CARDIOVASCULAR - ADULT  Goal: Maintains optimal cardiac output and hemodynamic stability  Description: INTERVENTIONS:  - Monitor vital signs, rhythm, and trends  - Monitor for bleeding, hypotension and signs of decreased cardiac output  - Evaluate effectiveness of vasoactive medications to optimize hemodynamic stability  - Monitor arterial and/or venous puncture sites for bleeding and/or hematoma  - Assess quality of pulses, skin color and temperature  - Assess for signs of decreased coronary artery perfusion - ex. Angina  - Evaluate fluid balance, assess for edema, trend weights  Outcome: Completed  Goal: Absence of cardiac arrhythmias or at baseline  Description: INTERVENTIONS:  - Continuous cardiac monitoring, monitor vital signs, obtain 12 lead EKG if indicated  - Evaluate effectiveness of antiarrhythmic and heart rate control medications as ordered  - Initiate emergency measures for life threatening arrhythmias  - Monitor electrolytes and administer replacement therapy as ordered  Outcome: Completed

## 2024-01-05 NOTE — PROGRESS NOTES
[facility]     Hospitalist Progress Note     Lucy Hoffman Patient Status:  Inpatient    1985 MRN [id number]   Location [facility] 8NE-A Attending Kari Wiley MD   Hosp Day # 3 PCP Navin Shah MD     Reason for consult: medical management, afib    Requested by: Kari Wiley MD      Subjective:     Feeling well, no complaints; eager to be discharged. Aware that she will need cardiac monitor following discharge    Objective:    Review of Systems:   A comprehensive review of systems was completed; pertinent positive and negatives stated in subjective.    Vital signs:  Temp:  [98.1 °F (36.7 °C)-98.4 °F (36.9 °C)] 98.4 °F (36.9 °C)  Pulse:  [] 91  Resp:  [18-29] 18  BP: (128-140)/(77-88) 133/86  SpO2:  [98 %-99 %] 98 %    Physical Exam:    General: No acute distress  Respiratory: no wheezes, no rhonchi  Cardiovascular: RRR  Abdomen: Soft, Non-tender, non-distended, positive bowel sounds  Neuro: No new focal deficits.   Extremities: no edema      Diagnostic Data:    Labs:  Recent Labs   Lab 24  1210 24  1951 24  0428   WBC 9.7 15.5* 13.8*   HGB 12.9 11.5* 10.5*   MCV 84.1 85.9 84.8   .0 199.0 219.0       Recent Labs   Lab 24  2225 24  0428   GLU 67*  --    BUN 8*  --    CREATSERUM 0.35*  --    CA 8.2*  --      --    K 3.8  3.8 4.5     --    CO2 21.0  --        Estimated Creatinine Clearance: 175.7 mL/min (A) (based on SCr of 0.35 mg/dL (L)).    No results for input(s): \"TROP\", \"TROPHS\", \"CK\" in the last 168 hours.    No results for input(s): \"PTP\", \"INR\" in the last 168 hours.                 Microbiology    No results found for this visit on 24.      Imaging: Reviewed in Epic.    Medications:    acetaminophen  1,000 mg Oral Q6H    docusate sodium  100 mg Oral BID@0600,1800       Assessment & Plan:      #New onset A.fib with RVR  - converted to NSR  - diltiazem drip weaned  - echo reviewed   - CTA negative for PE  - no significant blood  loss during procedure  - cardiology consulted > plan for cardiac monitor at discharge   - stable for discharge to home when cleared by OB service     # s/p  24    #Leukocytosis, likely d/t pregnancy/  - improving       Rosie Brown,     Supplementary Documentation:     Quality:  DVT Mechanical Prophylaxis:   SCDs, Early ambuation  DVT Pharmacologic Prophylaxis   Medication   None                Code Status: Not on file  Smith: No urinary catheter in place      The  [employer] Cures Act makes medical notes like these available to patients in the interest of transparency. Please be advised this is a medical document. Medical documents are intended to carry relevant information, facts as evident, and the clinical opinion of the practitioner. The medical note is intended as peer to peer communication and may appear blunt or direct. It is written in medical language and may contain abbreviations or verbiage that are unfamiliar.

## 2024-01-05 NOTE — DISCHARGE INSTRUCTIONS
For the next six weeks:  -Nothing in the vagina (no sex, no tampons)  -Do not lift anything heavier than 15 pounds  -Limit use of stairs  -Avoid vigorous exercise  -Do not drive while taking Norco    Call the office for:  -Pain not relieved by pain medication  -Pus or discharge from wound  -Bleeding that soaks more than one pad per hour  -Fever >100.5    Clean your incision once daily with soap and water. Pat to dry. Keep the incision dry between washes.  Avoid constipation. Take colace twice daily, fiber, laxatives, water, and caffeine as needed.  Remove your steristrips in 2 weeks.

## 2024-01-05 NOTE — PROGRESS NOTES
OB Progress Note POD#3  S: She is feeling well. Ambulating. Pain controlled. Minimal VB. Eating, passing gas. Breastfeeding.    O:  Blood pressure 129/80, pulse 79, temperature 97.6 °F (36.4 °C), temperature source Oral, resp. rate 17, weight 165 lb 12.8 oz (75.2 kg), last menstrual period 04/04/2023, SpO2 98%, currently breastfeeding.  No intake or output data in the 24 hours ending 01/05/24 1400    Gen: NAD, AAOx3  Exam per RN  Abdomen: soft, minimally tender, nondistended, incision C/D/I  Gyne: minimal lochia  Ext: trace pitting edema         A/P: POD #3 s/p LTCS  1) Pain: scheduled ibuprofen and norco; counseled on the importance of good pain control  2) Breastfeeding: lactation consult PRN; pt given encouragement  3) CV/resp: afib after delivery, resolved on diltiazem IV  -cardiology cleared for discharge  4) GI: general diet  5) /gyne: duron out, normal lochia  6) Heme: asymptomatic anemia  7) DVT ppx: ambulating  8) Fluids/elec: saline locked    Home today    Kailee Almonte (previously Salvatore) MD [facility]  Contact via Perfect Serve

## 2024-01-05 NOTE — PROGRESS NOTES
[facility] Card[facility]  Consultation Note      Lucy Hoffman Patient Status:  Inpatient    1985 MRN [id number]   [facility] 8NE-A Attending Kari Wiley MD   Hosp Day # 3 PCP Navin Shah MD     Reason for consult: Atrial fibrillation    Subjective: No CP, SOB or palpiations. Feeling well. A little tired after her first night alone with baby.     Impression:  38 year old female s/p C- section noted to be in AF RVR, now spontaneously converted to SR  Moderate LAE on echo    Recommendations:  Stable off diltiazem drip. As no prior history of AF, will not start her on oral jaren blockers for now, unless she has recurrence.   CHADS VASC is 1 only for female, low risk for thromboembolism, thus will not start OAC.   Moderate LAE is curious, no history of HTN. Consider whether she has had occult AF in the past. Will place a 2 week cardiac monitor at future follow up - not urgent, will let her recover and breast feed without disruption.   OK for dc from CV standpoint whenever planned by OB.     History of Present Illness:    Lucy Hoffman is a 38 year old female with no prior CV history who underwent uncomplicated C section. Post procedure found to be in AF RVR. Denies any symptoms from this - no CP, SOB, palpitations, LH. No known history of arrhythmias in her or her family. This morning on diltiazem drip, spontaneously converted to SR. Currently feels well without complaints.     Medications:    History reviewed. No pertinent past medical history.    History reviewed. No pertinent surgical history.    Family History  family history is not on file.    Social History   reports that she has never smoked. She has never used smokeless tobacco. She reports that she does not currently use alcohol. She reports that she does not use drugs.     Allergies  No Known Allergies    Review of Systems:  As per HPI, otherwise 10 point ROS is negative in detail.    Physical Exam:  Blood pressure 129/80, pulse 79,  temperature 97.6 °F (36.4 °C), temperature source Oral, resp. rate 17, weight 165 lb 12.8 oz (75.2 kg), last menstrual period 2023, SpO2 98%, currently breastfeeding.  Temp (24hrs), Av.1 °F (36.7 °C), Min:97.6 °F (36.4 °C), Max:98.4 °F (36.9 °C)    Wt Readings from Last 3 Encounters:   24 165 lb 12.8 oz (75.2 kg)   23 164 lb 12.8 oz (74.8 kg)       General: Awake and alert; in no acute distress  HEENT: Extraocular movements are intact; sclerae are anicteric; scalp is atraumatic  Neck: Supple; no JVD; no carotid bruits  Cardiac: Regular rate and regular rhythm; normal S1 and S2, no murmurs, rubs, or gallops are appreciated  Lungs: Clear to auscultation bilaterally; no accessory muscle use is noted, no wheezes, rhonci or rales  Abdomen: Soft, non-distended, non-tender; bowel sounds are normoactive  Extremities: Warm, no edema, clubbing or cyanosis; moves all 4 extremities normally, distal pulses intact and equal  Psychiatric: Normal mood and affect; answers questions appropriately  Dermatologic: No rashes; normal skin turgor    Diagnostic testing:    Labs:   No results found for: \"PT\", \"INR\"            Cardiac diagnostics:    EKG 1/3/2024: AF RVR    Echo 1/3/2024:  1. Left ventricle: The cavity size was normal. Wall thickness was normal.      Systolic function was normal. The estimated ejection fraction was 60-65%.      Unable to assess LV diastolic function.   2. Left atrium: The left atrial volume was moderately increased.   3. Pulmonary arteries: Systolic pressure was within the normal range,      estimated to be 28mm Hg.   Impressions:  No previous study was available for comparison.       Thank you for allowing our practice to participate in the care of your patient. Please do not hesitate to contact me if you have any questions.    Marine Chin, APRN  2024  8:24 AM

## 2024-01-05 NOTE — PLAN OF CARE
Problem: POSTPARTUM  Goal: Long Term Goal:Experiences normal postpartum course  Description: INTERVENTIONS:  - Assess and monitor vital signs and lab values.  - Assess fundus and lochia.  - Provide ice/sitz baths for perineum discomfort.  - Monitor healing of incision/episiotomy/laceration, and assess for signs and symptoms of infection and hematoma.  - Assess bladder function and monitor for bladder distention.  - Provide/instruct/assist with pericare as needed.  - Provide VTE prophylaxis as needed.  - Monitor bowel function.  - Encourage ambulation and provide assistance as needed.  - Assess and monitor emotional status and provide social service/psych resources as needed.  - Utilize standard precautions and use personal protective equipment as indicated. Ensure aseptic care of all intravenous lines and invasive tubes/drains.  - Obtain immunization and exposure to communicable diseases history.  Outcome: Progressing  Goal: Optimize infant feeding at the breast  Description: INTERVENTIONS:  - Initiate breast feeding within first hour after birth.   - Monitor effectiveness of current breast feeding efforts.  - Assess support systems available to mother/family.  - Identify cultural beliefs/practices regarding lactation, letdown techniques, maternal food preferences.  - Assess mother's knowledge and previous experience with breast feeding.  - Provide information as needed about early infant feeding cues (e.g., rooting, lip smacking, sucking fingers/hand) versus late cue of crying.  - Discuss/demonstrate breast feeding aids (e.g., infant sling, nursing footstool/pillows, and breast pumps).  - Encourage mother/other family members to express feelings/concerns, and actively listen.  - Educate father/SO about benefits of breast feeding and how to manage common lactation challenges.  - Recommend avoidance of specific medications or substances incompatible with breast feeding.  - Assess and monitor for signs of nipple  pain/trauma.  - Instruct and provide assistance with proper latch.  - Review techniques for milk expression (breast pumping) and storage of breast milk. Provide pumping equipment/supplies, instructions and assistance, as needed.  - Encourage rooming-in and breast feeding on demand.  - Encourage skin-to-skin contact.  - Provide LC support as needed.  - Assess for and manage engorgement.  - Provide breast feeding education handouts and information on community breast feeding support.   Outcome: Progressing  Goal: Appropriate maternal -  bonding  Description: INTERVENTIONS:  - Assess caregiver- interactions.  - Assess caregiver's emotional status and coping mechanisms.  - Encourage caregiver to participate in  daily care.  - Assess support systems available to mother/family.  - Provide /case management support as needed.  Outcome: Progressing     Problem: CARDIOVASCULAR - ADULT  Goal: Maintains optimal cardiac output and hemodynamic stability  Description: INTERVENTIONS:  - Monitor vital signs, rhythm, and trends  - Monitor for bleeding, hypotension and signs of decreased cardiac output  - Evaluate effectiveness of vasoactive medications to optimize hemodynamic stability  - Monitor arterial and/or venous puncture sites for bleeding and/or hematoma  - Assess quality of pulses, skin color and temperature  - Assess for signs of decreased coronary artery perfusion - ex. Angina  - Evaluate fluid balance, assess for edema, trend weights  Outcome: Progressing  Goal: Absence of cardiac arrhythmias or at baseline  Description: INTERVENTIONS:  - Continuous cardiac monitoring, monitor vital signs, obtain 12 lead EKG if indicated  - Evaluate effectiveness of antiarrhythmic and heart rate control medications as ordered  - Initiate emergency measures for life threatening arrhythmias  - Monitor electrolytes and administer replacement therapy as ordered  Outcome: Progressing

## 2024-01-07 ENCOUNTER — TELEPHONE (OUTPATIENT)
Dept: OBGYN UNIT | Facility: HOSPITAL | Age: 39
End: 2024-01-07

## 2024-01-07 NOTE — PROGRESS NOTES
Cradle call completed. Mom and baby care reviewed. Baby goes to pediatrician Tuesday. All questions answered. Cradle call letters sent via [employer].

## 2024-06-01 ENCOUNTER — OFFICE VISIT (OUTPATIENT)
Dept: FAMILY MEDICINE CLINIC | Facility: CLINIC | Age: 39
End: 2024-06-01
Payer: COMMERCIAL

## 2024-06-01 VITALS
WEIGHT: 145 LBS | OXYGEN SATURATION: 97 % | DIASTOLIC BLOOD PRESSURE: 84 MMHG | RESPIRATION RATE: 18 BRPM | TEMPERATURE: 98 F | HEART RATE: 102 BPM | HEIGHT: 62 IN | BODY MASS INDEX: 26.68 KG/M2 | SYSTOLIC BLOOD PRESSURE: 128 MMHG

## 2024-06-01 DIAGNOSIS — B34.9 VIRAL SYNDROME: ICD-10-CM

## 2024-06-01 DIAGNOSIS — J40 BRONCHITIS: Primary | ICD-10-CM

## 2024-06-01 RX ORDER — ALBUTEROL SULFATE 90 UG/1
1-2 AEROSOL, METERED RESPIRATORY (INHALATION) EVERY 6 HOURS PRN
Qty: 1 EACH | Refills: 0 | Status: SHIPPED | OUTPATIENT
Start: 2024-06-01 | End: 2024-06-01

## 2024-06-01 RX ORDER — NORGESTIMATE AND ETHINYL ESTRADIOL 7DAYSX3 28
1 KIT ORAL DAILY
COMMUNITY
Start: 2024-02-16 | End: 2025-05-11

## 2024-06-01 RX ORDER — BENZONATATE 200 MG/1
200 CAPSULE ORAL 3 TIMES DAILY PRN
Qty: 30 CAPSULE | Refills: 0 | Status: SHIPPED | OUTPATIENT
Start: 2024-06-01 | End: 2024-06-01

## 2024-06-01 RX ORDER — BENZONATATE 200 MG/1
200 CAPSULE ORAL 3 TIMES DAILY PRN
Qty: 20 CAPSULE | Refills: 0 | Status: SHIPPED | OUTPATIENT
Start: 2024-06-01 | End: 2024-06-11

## 2024-06-01 NOTE — PROGRESS NOTES
CHIEF COMPLAINT:     Chief Complaint   Patient presents with    Cough     X 10 days, worsening, runny nose and congestion x 2 days, OTC halls, tylenol        HPI:   Lucy Hoffman is a 38 year old female who presents with URI symptoms for 10 days.  Runny nose started 2 days ago.     Fever:     Yes []     No [x]        Fatigue: Yes [x]     No []   Runny nose : Yes [x]     No []      Sinus pressure: Yes []     No [x]   Sore throat:   Yes []      No [x]    scratchy  Ear Pain:  Yes []      No [x]      Cough:    Yes [x]     No []    Cough is very deep.  Patient is having coughing fits a few times a day.    Dry [x]     Productive [x]   Shortness of breath:  Yes []   No [x]     Wheezing:   Yes [x]   No []     Chest pain/pressure:     Yes []     No [x]      GI symptoms: Yes [x]     No []                     Nausea  []; Vomiting  [x]; Diarrhea  [] ; Upset stomach [];   Abdominal Pain  []   Has vomited from coughing too hard.     Patient has tried halls and tylenol for symptoms.       Current Outpatient Medications   Medication Sig Dispense Refill    benzonatate 200 MG Oral Cap Take 1 capsule (200 mg total) by mouth 3 (three) times daily as needed for cough. 30 capsule 0    prenatal vitamin with DHA 27-0.8-228 MG Oral Cap Take 1 capsule by mouth daily.      Norgestim-Eth Estrad Triphasic 0.18/0.215/0.25 MG-35 MCG Oral Tab Take 1 tablet by mouth daily. (Patient not taking: Reported on 6/1/2024)      HYDROcodone-acetaminophen 5-325 MG Oral Tab Take 1-2 tablets by mouth every 6 (six) hours as needed. 8 tablet 0    ibuprofen 600 MG Oral Tab Take 1 tablet (600 mg total) by mouth every 6 (six) hours as needed for Pain. 60 tablet 0    ferrous sulfate 325 (65 FE) MG Oral Tab EC Take 1 tablet (325 mg total) by mouth daily with breakfast. (Patient not taking: Reported on 6/1/2024)        No past medical history on file.   No past surgical history on file.      Social History     Socioeconomic History    Marital status:    Tobacco Use     Smoking status: Never    Smokeless tobacco: Never   Vaping Use    Vaping status: Never Used   Substance and Sexual Activity    Alcohol use: Not Currently    Drug use: Never     Social Determinants of Health     Financial Resource Strain: Low Risk  (1/2/2024)    Financial Resource Strain     Difficulty of Paying Living Expenses: Not very hard     Med Affordability: No   Food Insecurity: No Food Insecurity (1/3/2024)    Food Insecurity     Food Insecurity: Never true   Transportation Needs: No Transportation Needs (1/3/2024)    Transportation Needs     Lack of Transportation: No   Stress: No Stress Concern Present (1/2/2024)    Stress     Feeling of Stress : No   Housing Stability: Low Risk  (1/3/2024)    Housing Stability     Housing Instability: No         REVIEW OF SYSTEMS:   GENERAL: Normal appetite  SKIN: no rashes or abnormal skin lesions  HEENT: See HPI  LUNGS: denies shortness of breath or wheezing, See HPI  CARDIOVASCULAR: denies chest pain or palpitations   GI: denies N/V/C or abdominal pain  NEURO: Denies headaches    EXAM:   /84   Pulse 102   Temp 97.5 °F (36.4 °C)   Resp 18   Ht 5' 2\" (1.575 m)   Wt 145 lb (65.8 kg)   LMP 05/10/2024 (Approximate)   SpO2 97%   Breastfeeding No   BMI 26.52 kg/m²   GENERAL: well developed, well nourished,in no apparent distress  SKIN: no rashes,no suspicious lesions  HEAD: atraumatic, normocephalic.  No tenderness on palpation of maxillary sinuses.  No tenderness on palpation of frontal sinuses.  EYES: conjunctiva clear, EOM intact  EARS: TM's not erythematous, no bulging, no retraction, no fluid, bony landmarks intact.  EACs WNL BL.    NOSE: Nostrils patent, no nasal discharge, nasal mucosa pink  THROAT: oral mucosa pink, moist. Posterior pharynx minimally erythematous. No exudates.  No uvular deviation, drooling, muffled voice, hot potato voice, trismus, or signs of abscess.   NECK: Supple, non-tender  LUNGS: clear to auscultation bilaterally, no wheezes  or rhonchi. Breathing is non labored.  CARDIO: RRR without murmur  EXTREMITIES: no cyanosis, clubbing or edema  LYMPH:  No anterior cervical lymphadenopathy. No submandibular lymphadenopathy.  No posterior cervical or occipital lymphadenopathy.    No results found for this or any previous visit (from the past 24 hour(s)).    ASSESSMENT AND PLAN:   Lucy Hoffman is a 38 year old female who presents with symptoms that are consistent with    ASSESSMENT:   Encounter Diagnoses   Name Primary?    Bronchitis Yes    Viral syndrome        PLAN: Meds as below.  See patient Instructions.  See attached patient references.   -Patient has not tried OTC cough suppressant yet. Will trial tessalon perles.  Advised needs to be seen with any worsening symptoms.     Meds & Refills for this Visit:  Requested Prescriptions     Signed Prescriptions Disp Refills    benzonatate 200 MG Oral Cap 30 capsule 0     Sig: Take 1 capsule (200 mg total) by mouth 3 (three) times daily as needed for cough.       Risks, benefits, and side effects of medication explained and discussed.      Patient Instructions   -Zyrtec  -Robitussin    -Push fluids  -Cool mist humidifier  -Tea with honey  -Tylenol/motrin as needed  -Must be seen with worsening symptoms        The patient/parent indicates understanding of these issues and agrees to the plan.

## 2024-06-01 NOTE — PATIENT INSTRUCTIONS
-Zyrtec  -Robitussin    -Push fluids  -Cool mist humidifier  -Tea with honey  -Tylenol/motrin as needed  -Must be seen with worsening symptoms

## 2025-07-20 ENCOUNTER — TELEPHONE (OUTPATIENT)
Dept: OBGYN UNIT | Facility: HOSPITAL | Age: 40
End: 2025-07-20

## 2025-07-21 ENCOUNTER — TELEPHONE (OUTPATIENT)
Dept: OBGYN UNIT | Facility: HOSPITAL | Age: 40
End: 2025-07-21

## 2025-08-01 ENCOUNTER — ANESTHESIA (OUTPATIENT)
Dept: OBGYN UNIT | Facility: HOSPITAL | Age: 40
End: 2025-08-01

## 2025-08-01 ENCOUNTER — HOSPITAL ENCOUNTER (INPATIENT)
Facility: HOSPITAL | Age: 40
LOS: 2 days | Discharge: HOME | End: 2025-08-03
Attending: OBSTETRICS & GYNECOLOGY | Admitting: OBSTETRICS & GYNECOLOGY

## 2025-08-01 ENCOUNTER — ANESTHESIA EVENT (OUTPATIENT)
Dept: OBGYN UNIT | Facility: HOSPITAL | Age: 40
End: 2025-08-01

## 2025-08-01 DIAGNOSIS — Z98.891 H/O CESAREAN SECTION: Primary | ICD-10-CM

## 2025-08-01 LAB
ANTIBODY SCREEN: NEGATIVE
BASOPHILS # BLD AUTO: 0.02 X10(3) UL (ref 0–0.2)
BASOPHILS NFR BLD AUTO: 0.3 %
EOSINOPHIL # BLD AUTO: 0.16 X10(3) UL (ref 0–0.7)
EOSINOPHIL NFR BLD AUTO: 2.1 %
ERYTHROCYTE [DISTWIDTH] IN BLOOD BY AUTOMATED COUNT: 13.9 %
HCT VFR BLD AUTO: 35.4 % (ref 35–48)
HGB BLD-MCNC: 11.8 G/DL (ref 12–16)
IMM GRANULOCYTES # BLD AUTO: 0.03 X10(3) UL (ref 0–1)
IMM GRANULOCYTES NFR BLD: 0.4 %
LYMPHOCYTES # BLD AUTO: 2.66 X10(3) UL (ref 1–4)
LYMPHOCYTES NFR BLD AUTO: 34.3 %
MCH RBC QN AUTO: 27.9 PG (ref 26–34)
MCHC RBC AUTO-ENTMCNC: 33.3 G/DL (ref 31–37)
MCV RBC AUTO: 83.7 FL (ref 80–100)
MONOCYTES # BLD AUTO: 0.6 X10(3) UL (ref 0.1–1)
MONOCYTES NFR BLD AUTO: 7.7 %
NEUTROPHILS # BLD AUTO: 4.28 X10 (3) UL (ref 1.5–7.7)
NEUTROPHILS # BLD AUTO: 4.28 X10(3) UL (ref 1.5–7.7)
NEUTROPHILS NFR BLD AUTO: 55.2 %
PLATELET # BLD AUTO: 248 10(3)UL (ref 150–450)
RBC # BLD AUTO: 4.23 X10(6)UL (ref 3.8–5.3)
RH BLOOD TYPE: POSITIVE
T PALLIDUM AB SER QL IA: NONREACTIVE
WBC # BLD AUTO: 7.8 X10(3) UL (ref 4–11)

## 2025-08-01 PROCEDURE — 85025 COMPLETE CBC W/AUTO DIFF WBC: CPT | Performed by: OBSTETRICS & GYNECOLOGY

## 2025-08-01 PROCEDURE — 86900 BLOOD TYPING SEROLOGIC ABO: CPT | Performed by: OBSTETRICS & GYNECOLOGY

## 2025-08-01 PROCEDURE — 86901 BLOOD TYPING SEROLOGIC RH(D): CPT | Performed by: OBSTETRICS & GYNECOLOGY

## 2025-08-01 PROCEDURE — 86850 RBC ANTIBODY SCREEN: CPT | Performed by: OBSTETRICS & GYNECOLOGY

## 2025-08-01 PROCEDURE — 86780 TREPONEMA PALLIDUM: CPT | Performed by: OBSTETRICS & GYNECOLOGY

## 2025-08-01 RX ORDER — NALBUPHINE HYDROCHLORIDE 10 MG/ML
2.5 INJECTION INTRAMUSCULAR; INTRAVENOUS; SUBCUTANEOUS
Status: DISCONTINUED | OUTPATIENT
Start: 2025-08-01 | End: 2025-08-01 | Stop reason: HOSPADM

## 2025-08-01 RX ORDER — KETOROLAC TROMETHAMINE 30 MG/ML
30 INJECTION, SOLUTION INTRAMUSCULAR; INTRAVENOUS ONCE
Status: COMPLETED | OUTPATIENT
Start: 2025-08-01 | End: 2025-08-01

## 2025-08-01 RX ORDER — ONDANSETRON 2 MG/ML
4 INJECTION INTRAMUSCULAR; INTRAVENOUS EVERY 6 HOURS PRN
Status: DISCONTINUED | OUTPATIENT
Start: 2025-08-01 | End: 2025-08-01

## 2025-08-01 RX ORDER — KETOROLAC TROMETHAMINE 30 MG/ML
30 INJECTION, SOLUTION INTRAMUSCULAR; INTRAVENOUS EVERY 6 HOURS
Status: DISPENSED | OUTPATIENT
Start: 2025-08-01 | End: 2025-08-02

## 2025-08-01 RX ORDER — OXYCODONE HYDROCHLORIDE 5 MG/1
5 TABLET ORAL EVERY 6 HOURS PRN
Status: DISCONTINUED | OUTPATIENT
Start: 2025-08-01 | End: 2025-08-03

## 2025-08-01 RX ORDER — SODIUM CHLORIDE, SODIUM LACTATE, POTASSIUM CHLORIDE, CALCIUM CHLORIDE 600; 310; 30; 20 MG/100ML; MG/100ML; MG/100ML; MG/100ML
INJECTION, SOLUTION INTRAVENOUS CONTINUOUS
Status: DISCONTINUED | OUTPATIENT
Start: 2025-08-01 | End: 2025-08-01

## 2025-08-01 RX ORDER — ONDANSETRON 2 MG/ML
4 INJECTION INTRAMUSCULAR; INTRAVENOUS EVERY 6 HOURS PRN
Status: DISCONTINUED | OUTPATIENT
Start: 2025-08-01 | End: 2025-08-03

## 2025-08-01 RX ORDER — HYDROMORPHONE HYDROCHLORIDE 1 MG/ML
0.4 INJECTION, SOLUTION INTRAMUSCULAR; INTRAVENOUS; SUBCUTANEOUS EVERY 5 MIN PRN
Status: DISCONTINUED | OUTPATIENT
Start: 2025-08-01 | End: 2025-08-01 | Stop reason: HOSPADM

## 2025-08-01 RX ORDER — BUPIVACAINE HYDROCHLORIDE 7.5 MG/ML
INJECTION, SOLUTION INTRASPINAL AS NEEDED
Status: DISCONTINUED | OUTPATIENT
Start: 2025-08-01 | End: 2025-08-01 | Stop reason: SURG

## 2025-08-01 RX ORDER — DOCUSATE SODIUM 100 MG/1
100 CAPSULE, LIQUID FILLED ORAL
Status: DISCONTINUED | OUTPATIENT
Start: 2025-08-01 | End: 2025-08-03

## 2025-08-01 RX ORDER — SODIUM CHLORIDE, SODIUM LACTATE, POTASSIUM CHLORIDE, CALCIUM CHLORIDE 600; 310; 30; 20 MG/100ML; MG/100ML; MG/100ML; MG/100ML
125 INJECTION, SOLUTION INTRAVENOUS CONTINUOUS
Status: DISCONTINUED | OUTPATIENT
Start: 2025-08-01 | End: 2025-08-01 | Stop reason: HOSPADM

## 2025-08-01 RX ORDER — DEXTROSE, SODIUM CHLORIDE, SODIUM LACTATE, POTASSIUM CHLORIDE, AND CALCIUM CHLORIDE 5; .6; .31; .03; .02 G/100ML; G/100ML; G/100ML; G/100ML; G/100ML
INJECTION, SOLUTION INTRAVENOUS CONTINUOUS PRN
Status: DISCONTINUED | OUTPATIENT
Start: 2025-08-01 | End: 2025-08-03

## 2025-08-01 RX ORDER — ONDANSETRON 2 MG/ML
4 INJECTION INTRAMUSCULAR; INTRAVENOUS ONCE AS NEEDED
Status: DISCONTINUED | OUTPATIENT
Start: 2025-08-01 | End: 2025-08-01 | Stop reason: HOSPADM

## 2025-08-01 RX ORDER — PHENYLEPHRINE HCL 10 MG/ML
VIAL (ML) INJECTION AS NEEDED
Status: DISCONTINUED | OUTPATIENT
Start: 2025-08-01 | End: 2025-08-01 | Stop reason: SURG

## 2025-08-01 RX ORDER — DIPHENHYDRAMINE HYDROCHLORIDE 50 MG/ML
12.5 INJECTION, SOLUTION INTRAMUSCULAR; INTRAVENOUS EVERY 4 HOURS PRN
Status: DISCONTINUED | OUTPATIENT
Start: 2025-08-01 | End: 2025-08-03

## 2025-08-01 RX ORDER — ACETAMINOPHEN 500 MG
1000 TABLET ORAL EVERY 6 HOURS
Status: DISCONTINUED | OUTPATIENT
Start: 2025-08-01 | End: 2025-08-03

## 2025-08-01 RX ORDER — MORPHINE SULFATE 2 MG/ML
INJECTION, SOLUTION INTRAMUSCULAR; INTRAVENOUS AS NEEDED
Status: DISCONTINUED | OUTPATIENT
Start: 2025-08-01 | End: 2025-08-01 | Stop reason: SURG

## 2025-08-01 RX ORDER — SODIUM CHLORIDE, SODIUM LACTATE, POTASSIUM CHLORIDE, CALCIUM CHLORIDE 600; 310; 30; 20 MG/100ML; MG/100ML; MG/100ML; MG/100ML
INJECTION, SOLUTION INTRAVENOUS CONTINUOUS PRN
Status: DISCONTINUED | OUTPATIENT
Start: 2025-08-01 | End: 2025-08-01 | Stop reason: SURG

## 2025-08-01 RX ORDER — HYDROMORPHONE HYDROCHLORIDE 1 MG/ML
0.2 INJECTION, SOLUTION INTRAMUSCULAR; INTRAVENOUS; SUBCUTANEOUS EVERY 5 MIN PRN
Status: DISCONTINUED | OUTPATIENT
Start: 2025-08-01 | End: 2025-08-01 | Stop reason: HOSPADM

## 2025-08-01 RX ORDER — BISACODYL 10 MG
10 SUPPOSITORY, RECTAL RECTAL ONCE AS NEEDED
Status: DISCONTINUED | OUTPATIENT
Start: 2025-08-01 | End: 2025-08-03

## 2025-08-01 RX ORDER — NALOXONE HYDROCHLORIDE 0.4 MG/ML
0.08 INJECTION, SOLUTION INTRAMUSCULAR; INTRAVENOUS; SUBCUTANEOUS
Status: ACTIVE | OUTPATIENT
Start: 2025-08-01 | End: 2025-08-02

## 2025-08-01 RX ORDER — IBUPROFEN 600 MG/1
600 TABLET, FILM COATED ORAL EVERY 6 HOURS
Status: DISCONTINUED | OUTPATIENT
Start: 2025-08-02 | End: 2025-08-03

## 2025-08-01 RX ORDER — DIPHENHYDRAMINE HCL 25 MG
25 CAPSULE ORAL EVERY 4 HOURS PRN
Status: DISCONTINUED | OUTPATIENT
Start: 2025-08-01 | End: 2025-08-03

## 2025-08-01 RX ORDER — CITRIC ACID/SODIUM CITRATE 334-500MG
30 SOLUTION, ORAL ORAL ONCE
Status: DISCONTINUED | OUTPATIENT
Start: 2025-08-01 | End: 2025-08-01 | Stop reason: HOSPADM

## 2025-08-01 RX ORDER — SIMETHICONE 80 MG
80 TABLET,CHEWABLE ORAL 3 TIMES DAILY PRN
Status: DISCONTINUED | OUTPATIENT
Start: 2025-08-01 | End: 2025-08-03

## 2025-08-01 RX ORDER — HYDROMORPHONE HYDROCHLORIDE 1 MG/ML
0.4 INJECTION, SOLUTION INTRAMUSCULAR; INTRAVENOUS; SUBCUTANEOUS EVERY 2 HOUR PRN
Status: ACTIVE | OUTPATIENT
Start: 2025-08-01 | End: 2025-08-02

## 2025-08-01 RX ORDER — NALBUPHINE HYDROCHLORIDE 10 MG/ML
2.5 INJECTION INTRAMUSCULAR; INTRAVENOUS; SUBCUTANEOUS EVERY 4 HOURS PRN
Status: DISCONTINUED | OUTPATIENT
Start: 2025-08-01 | End: 2025-08-03

## 2025-08-01 RX ORDER — AMMONIA 15 % (W/V)
0.3 AMPUL (EA) INHALATION AS NEEDED
Status: DISCONTINUED | OUTPATIENT
Start: 2025-08-01 | End: 2025-08-03

## 2025-08-01 RX ORDER — KETOROLAC TROMETHAMINE 30 MG/ML
INJECTION, SOLUTION INTRAMUSCULAR; INTRAVENOUS
Status: COMPLETED
Start: 2025-08-01 | End: 2025-08-01

## 2025-08-01 RX ORDER — ACETAMINOPHEN 500 MG
1000 TABLET ORAL ONCE
Status: COMPLETED | OUTPATIENT
Start: 2025-08-01 | End: 2025-08-01

## 2025-08-01 RX ADMIN — PHENYLEPHRINE HCL 40 MCG: 10 MG/ML VIAL (ML) INJECTION at 10:47:00

## 2025-08-01 RX ADMIN — MORPHINE SULFATE 0.1 MG: 2 INJECTION, SOLUTION INTRAMUSCULAR; INTRAVENOUS at 10:39:00

## 2025-08-01 RX ADMIN — SODIUM CHLORIDE, SODIUM LACTATE, POTASSIUM CHLORIDE, CALCIUM CHLORIDE: 600; 310; 30; 20 INJECTION, SOLUTION INTRAVENOUS at 10:35:00

## 2025-08-01 RX ADMIN — BUPIVACAINE HYDROCHLORIDE 1.6 ML: 7.5 INJECTION, SOLUTION INTRASPINAL at 10:39:00

## 2025-08-01 RX ADMIN — PHENYLEPHRINE HCL 40 MCG: 10 MG/ML VIAL (ML) INJECTION at 10:40:00

## 2025-08-02 LAB
BASOPHILS # BLD AUTO: 0.02 X10(3) UL (ref 0–0.2)
BASOPHILS NFR BLD AUTO: 0.1 %
EOSINOPHIL # BLD AUTO: 0.07 X10(3) UL (ref 0–0.7)
EOSINOPHIL NFR BLD AUTO: 0.5 %
ERYTHROCYTE [DISTWIDTH] IN BLOOD BY AUTOMATED COUNT: 13.8 %
HCT VFR BLD AUTO: 30.1 % (ref 35–48)
HGB BLD-MCNC: 10.2 G/DL (ref 12–16)
IMM GRANULOCYTES # BLD AUTO: 0.07 X10(3) UL (ref 0–1)
IMM GRANULOCYTES NFR BLD: 0.5 %
LYMPHOCYTES # BLD AUTO: 3.63 X10(3) UL (ref 1–4)
LYMPHOCYTES NFR BLD AUTO: 25.2 %
MCH RBC QN AUTO: 28.8 PG (ref 26–34)
MCHC RBC AUTO-ENTMCNC: 33.9 G/DL (ref 31–37)
MCV RBC AUTO: 85 FL (ref 80–100)
MONOCYTES # BLD AUTO: 1.15 X10(3) UL (ref 0.1–1)
MONOCYTES NFR BLD AUTO: 8 %
NEUTROPHILS # BLD AUTO: 9.46 X10 (3) UL (ref 1.5–7.7)
NEUTROPHILS # BLD AUTO: 9.46 X10(3) UL (ref 1.5–7.7)
NEUTROPHILS NFR BLD AUTO: 65.7 %
PLATELET # BLD AUTO: 223 10(3)UL (ref 150–450)
RBC # BLD AUTO: 3.54 X10(6)UL (ref 3.8–5.3)
WBC # BLD AUTO: 14.4 X10(3) UL (ref 4–11)

## 2025-08-02 PROCEDURE — 90471 IMMUNIZATION ADMIN: CPT

## 2025-08-02 PROCEDURE — 85025 COMPLETE CBC W/AUTO DIFF WBC: CPT | Performed by: OBSTETRICS & GYNECOLOGY

## 2025-08-03 VITALS
HEART RATE: 86 BPM | SYSTOLIC BLOOD PRESSURE: 123 MMHG | WEIGHT: 168 LBS | BODY MASS INDEX: 30.91 KG/M2 | TEMPERATURE: 98 F | HEIGHT: 62 IN | DIASTOLIC BLOOD PRESSURE: 86 MMHG | RESPIRATION RATE: 15 BRPM | OXYGEN SATURATION: 98 %

## 2025-08-03 RX ORDER — PSEUDOEPHEDRINE HCL 30 MG
100 TABLET ORAL DAILY
Qty: 30 CAPSULE | Refills: 0 | Status: SHIPPED | OUTPATIENT
Start: 2025-08-03

## 2025-08-03 RX ORDER — IBUPROFEN 600 MG/1
600 TABLET, FILM COATED ORAL EVERY 6 HOURS PRN
Qty: 25 TABLET | Refills: 0 | Status: SHIPPED | OUTPATIENT
Start: 2025-08-03

## 2025-08-03 RX ORDER — HYDROCODONE BITARTRATE AND ACETAMINOPHEN 5; 325 MG/1; MG/1
1 TABLET ORAL EVERY 6 HOURS PRN
Qty: 15 TABLET | Refills: 0 | Status: SHIPPED | OUTPATIENT
Start: 2025-08-03

## 2025-08-07 ENCOUNTER — TELEPHONE (OUTPATIENT)
Dept: OBGYN UNIT | Facility: HOSPITAL | Age: 40
End: 2025-08-07

## (undated) DEVICE — Device

## (undated) NOTE — LETTER
Dear New MomGurdeep, we missed you! The nurses of [facility] have tried to reach you by phone to ask if you have any questions regarding your health or the health and care of your new little one.    We hope you are doing well. If, for any reason, you have questions or concerns about your health or your baby’s health, please contact your provider or your pediatrician or family medicine physician regarding your baby.     At [facility], we feel that postpartum support is very important for new families. Please see the enclosed new parent support flyer that lists support programs and resources with both in-person and online options.     Additionally, our Breastfeeding Centers at [facility] and [facility] in [place], offer outpatient visits with our International Board-Certified Lactation   Consultants (IBCLCs) for any breastfeeding concerns or questions you may have.    For issues related to stress, anxiety or depression, we have a Nurturing Mom support group that meets both in-person or online.  There’s also a 24-hour Mom’s Line where you can request a phone call from a clinical therapist for assistance for postpartum depression.    We encourage you to take advantage of these programs and resources as you recover from childbirth and learn to care for your new infant.    Best wishes,    [facility]            [id number]